# Patient Record
Sex: MALE | Race: WHITE | NOT HISPANIC OR LATINO
[De-identification: names, ages, dates, MRNs, and addresses within clinical notes are randomized per-mention and may not be internally consistent; named-entity substitution may affect disease eponyms.]

---

## 2018-05-28 ENCOUNTER — TRANSCRIPTION ENCOUNTER (OUTPATIENT)
Age: 58
End: 2018-05-28

## 2018-06-07 ENCOUNTER — APPOINTMENT (OUTPATIENT)
Dept: OPHTHALMOLOGY | Facility: CLINIC | Age: 58
End: 2018-06-07
Payer: COMMERCIAL

## 2018-06-07 PROCEDURE — 92004 COMPRE OPH EXAM NEW PT 1/>: CPT

## 2018-06-08 ENCOUNTER — APPOINTMENT (OUTPATIENT)
Dept: OPHTHALMOLOGY | Facility: CLINIC | Age: 58
End: 2018-06-08
Payer: COMMERCIAL

## 2018-06-08 PROCEDURE — 92014 COMPRE OPH EXAM EST PT 1/>: CPT

## 2018-06-12 ENCOUNTER — APPOINTMENT (OUTPATIENT)
Dept: OPHTHALMOLOGY | Facility: CLINIC | Age: 58
End: 2018-06-12
Payer: COMMERCIAL

## 2018-06-12 PROCEDURE — 92014 COMPRE OPH EXAM EST PT 1/>: CPT

## 2018-06-14 ENCOUNTER — APPOINTMENT (OUTPATIENT)
Dept: OPHTHALMOLOGY | Facility: CLINIC | Age: 58
End: 2018-06-14
Payer: COMMERCIAL

## 2018-06-14 PROCEDURE — 92012 INTRM OPH EXAM EST PATIENT: CPT

## 2018-06-21 ENCOUNTER — APPOINTMENT (OUTPATIENT)
Dept: OPHTHALMOLOGY | Facility: CLINIC | Age: 58
End: 2018-06-21
Payer: COMMERCIAL

## 2018-06-21 PROCEDURE — 92012 INTRM OPH EXAM EST PATIENT: CPT

## 2018-06-28 ENCOUNTER — APPOINTMENT (OUTPATIENT)
Dept: OPHTHALMOLOGY | Facility: CLINIC | Age: 58
End: 2018-06-28
Payer: COMMERCIAL

## 2018-06-28 PROCEDURE — 92014 COMPRE OPH EXAM EST PT 1/>: CPT

## 2018-07-27 ENCOUNTER — APPOINTMENT (OUTPATIENT)
Dept: OPHTHALMOLOGY | Facility: CLINIC | Age: 58
End: 2018-07-27
Payer: COMMERCIAL

## 2018-07-27 PROCEDURE — 92012 INTRM OPH EXAM EST PATIENT: CPT

## 2018-08-15 ENCOUNTER — TRANSCRIPTION ENCOUNTER (OUTPATIENT)
Age: 58
End: 2018-08-15

## 2018-09-25 ENCOUNTER — APPOINTMENT (OUTPATIENT)
Dept: OPHTHALMOLOGY | Facility: CLINIC | Age: 58
End: 2018-09-25

## 2018-10-25 ENCOUNTER — APPOINTMENT (OUTPATIENT)
Dept: OPHTHALMOLOGY | Facility: CLINIC | Age: 58
End: 2018-10-25

## 2018-11-30 ENCOUNTER — RECORD ABSTRACTING (OUTPATIENT)
Age: 58
End: 2018-11-30

## 2018-11-30 DIAGNOSIS — K21.9 GASTRO-ESOPHAGEAL REFLUX DISEASE W/OUT ESOPHAGITIS: ICD-10-CM

## 2018-12-05 ENCOUNTER — APPOINTMENT (OUTPATIENT)
Dept: PODIATRY | Facility: CLINIC | Age: 58
End: 2018-12-05
Payer: COMMERCIAL

## 2018-12-05 VITALS
SYSTOLIC BLOOD PRESSURE: 110 MMHG | HEIGHT: 71 IN | WEIGHT: 204 LBS | DIASTOLIC BLOOD PRESSURE: 70 MMHG | BODY MASS INDEX: 28.56 KG/M2

## 2018-12-05 DIAGNOSIS — Z80.0 FAMILY HISTORY OF MALIGNANT NEOPLASM OF DIGESTIVE ORGANS: ICD-10-CM

## 2018-12-05 DIAGNOSIS — M25.552 PAIN IN LEFT HIP: ICD-10-CM

## 2018-12-05 PROCEDURE — 99213 OFFICE O/P EST LOW 20 MIN: CPT

## 2018-12-05 RX ORDER — GANCICLOVIR 1.5 MG/G
0.15 GEL OPHTHALMIC
Qty: 1 | Refills: 3 | Status: DISCONTINUED | COMMUNITY
Start: 2018-06-07 | End: 2018-12-05

## 2018-12-05 RX ORDER — AMOXICILLIN AND CLAVULANATE POTASSIUM 875; 125 MG/1; 1/1
875-125 TABLET, FILM COATED ORAL
Refills: 0 | Status: DISCONTINUED | COMMUNITY
End: 2018-12-05

## 2018-12-05 RX ORDER — TRIFLURIDINE 10 MG/ML
1 SOLUTION OPHTHALMIC
Qty: 1 | Refills: 2 | Status: DISCONTINUED | COMMUNITY
Start: 2018-06-07 | End: 2018-12-05

## 2018-12-05 RX ORDER — BACITRACIN 500 [USP'U]/G
500 OINTMENT OPHTHALMIC
Qty: 1 | Refills: 3 | Status: DISCONTINUED | COMMUNITY
Start: 2018-06-07 | End: 2018-12-05

## 2018-12-06 ENCOUNTER — RESULT REVIEW (OUTPATIENT)
Age: 58
End: 2018-12-06

## 2018-12-10 ENCOUNTER — MOBILE ON CALL (OUTPATIENT)
Age: 58
End: 2018-12-10

## 2018-12-12 ENCOUNTER — APPOINTMENT (OUTPATIENT)
Dept: ORTHOPEDIC SURGERY | Facility: CLINIC | Age: 58
End: 2018-12-12

## 2019-02-08 ENCOUNTER — APPOINTMENT (OUTPATIENT)
Dept: GASTROENTEROLOGY | Facility: HOSPITAL | Age: 59
End: 2019-02-08

## 2019-02-08 ENCOUNTER — APPOINTMENT (OUTPATIENT)
Dept: GASTROENTEROLOGY | Facility: CLINIC | Age: 59
End: 2019-02-08
Payer: COMMERCIAL

## 2019-02-08 VITALS
SYSTOLIC BLOOD PRESSURE: 143 MMHG | WEIGHT: 204 LBS | DIASTOLIC BLOOD PRESSURE: 99 MMHG | HEIGHT: 71 IN | BODY MASS INDEX: 28.56 KG/M2 | HEART RATE: 76 BPM | OXYGEN SATURATION: 96 %

## 2019-02-08 PROCEDURE — 99214 OFFICE O/P EST MOD 30 MIN: CPT

## 2019-02-08 RX ORDER — DICLOFENAC 35 MG/1
35 CAPSULE ORAL
Refills: 0 | Status: DISCONTINUED | COMMUNITY
End: 2019-02-08

## 2019-02-08 NOTE — PHYSICAL EXAM
[Abdomen Soft] : soft [] : no hepato-splenomegaly [Abdomen Mass (___ Cm)] : no abdominal mass palpated [FreeTextEntry1] : mild llq tenderness, no guarding

## 2019-02-08 NOTE — HISTORY OF PRESENT ILLNESS
[FreeTextEntry1] : 1.  three week history of pain in the lower abdomen, diarrhea, off and on, irregular, sometimes marble like like, sometimes pranay\par can be firm in the pebble phase..\par no fever, no or occas chills.\par also some urinary dysfunction, interrupted urinary stream..\par some pain on left flank\par \par had diverticular resection;, 2005...had perforated s sigmoid diverticulm at that time.\par \par Meanwhile, the patient was doing well\par I had performed colonos and egd on octob 23d, 2017, and found a nl post op colo, some left sided tics, and egd with five cm HH.\par \par \par fast forward toabout three weeks. ago.\par \par nsaids until before four weeks ago..and this was for a torn ligament in foot\par about three weeks ago, started having some left sided pain, towards the left flank, going into llq, no fever, but irreg stools as mentioned above, loose, then constip, then pebble like..sometimes needs to strain..\par \par \par there is no change in reflux or upper gi sx.\par no fever that he has noticed.\par \par

## 2019-02-08 NOTE — ASSESSMENT
[FreeTextEntry1] : patient with new onset of lower abdom pain, and irreg bowel movements\par \par 2.  he has a history of diverticulitis with perforation and a resection\par \par 3.  also, some urinary symptoms, but more sx of urin freq, interrupted strea, not sx of a ureteral stone or renal coloic\par \par 4.  he took augmentin for several days, off and on, and it seemed to help, he had almost a ten day course, with an interruption of the meds which seemed to provoke some worstening\par \par 5.  now he does mot appear to have much tenderness, escept with deep llq palpation, and no guarding\par \par 6.  I would advise however that he take approx five more days of the augmentin, and also, take florastor a probiotic for the next month, 250 mg bid\par \par 7.  i am advising stat labs, and depending on how he is early next week, we could consider a ct scan, and perhaps even a urology consult

## 2019-02-09 LAB
APPEARANCE: CLEAR
BASOPHILS # BLD AUTO: 0.03 K/UL
BASOPHILS NFR BLD AUTO: 0.3 %
BILIRUBIN URINE: NEGATIVE
BLOOD URINE: NEGATIVE
COLOR: YELLOW
EOSINOPHIL # BLD AUTO: 0.33 K/UL
EOSINOPHIL NFR BLD AUTO: 2.9 %
ERYTHROCYTE [SEDIMENTATION RATE] IN BLOOD BY WESTERGREN METHOD: 14 MM/HR
GLUCOSE QUALITATIVE U: NEGATIVE MG/DL
HCT VFR BLD CALC: 46 %
HGB BLD-MCNC: 15.3 G/DL
IMM GRANULOCYTES NFR BLD AUTO: 0.3 %
KETONES URINE: NEGATIVE
LEUKOCYTE ESTERASE URINE: NEGATIVE
LYMPHOCYTES # BLD AUTO: 3.59 K/UL
LYMPHOCYTES NFR BLD AUTO: 31.7 %
MAN DIFF?: NORMAL
MCHC RBC-ENTMCNC: 29.3 PG
MCHC RBC-ENTMCNC: 33.3 GM/DL
MCV RBC AUTO: 88 FL
MONOCYTES # BLD AUTO: 0.8 K/UL
MONOCYTES NFR BLD AUTO: 7.1 %
NEUTROPHILS # BLD AUTO: 6.54 K/UL
NEUTROPHILS NFR BLD AUTO: 57.7 %
NITRITE URINE: NEGATIVE
PH URINE: 6
PLATELET # BLD AUTO: 334 K/UL
PROTEIN URINE: NEGATIVE MG/DL
RBC # BLD: 5.23 M/UL
RBC # FLD: 13.2 %
SPECIFIC GRAVITY URINE: 1.02
UROBILINOGEN URINE: NEGATIVE MG/DL
WBC # FLD AUTO: 11.32 K/UL

## 2019-02-11 ENCOUNTER — OTHER (OUTPATIENT)
Age: 59
End: 2019-02-11

## 2019-02-11 LAB
ALBUMIN SERPL ELPH-MCNC: 5 G/DL
ALP BLD-CCNC: 54 U/L
ALT SERPL-CCNC: 25 U/L
ANION GAP SERPL CALC-SCNC: 15 MMOL/L
AST SERPL-CCNC: 22 U/L
BILIRUB SERPL-MCNC: 1.2 MG/DL
BUN SERPL-MCNC: 15 MG/DL
CALCIUM SERPL-MCNC: 9.6 MG/DL
CHLORIDE SERPL-SCNC: 101 MMOL/L
CO2 SERPL-SCNC: 24 MMOL/L
CREAT SERPL-MCNC: 0.83 MG/DL
CRP SERPL-MCNC: 0.17 MG/DL
GLUCOSE SERPL-MCNC: 99 MG/DL
POTASSIUM SERPL-SCNC: 4.3 MMOL/L
PROT SERPL-MCNC: 7.3 G/DL
SODIUM SERPL-SCNC: 140 MMOL/L

## 2019-02-19 ENCOUNTER — RESULT REVIEW (OUTPATIENT)
Age: 59
End: 2019-02-19

## 2019-02-19 ENCOUNTER — CLINICAL ADVICE (OUTPATIENT)
Age: 59
End: 2019-02-19

## 2019-03-05 ENCOUNTER — APPOINTMENT (OUTPATIENT)
Dept: GASTROENTEROLOGY | Facility: CLINIC | Age: 59
End: 2019-03-05

## 2019-05-22 ENCOUNTER — RX RENEWAL (OUTPATIENT)
Age: 59
End: 2019-05-22

## 2019-06-18 ENCOUNTER — APPOINTMENT (OUTPATIENT)
Dept: OPHTHALMOLOGY | Facility: CLINIC | Age: 59
End: 2019-06-18
Payer: COMMERCIAL

## 2019-06-18 ENCOUNTER — NON-APPOINTMENT (OUTPATIENT)
Age: 59
End: 2019-06-18

## 2019-06-18 PROCEDURE — 92012 INTRM OPH EXAM EST PATIENT: CPT

## 2019-06-19 ENCOUNTER — RESULT REVIEW (OUTPATIENT)
Age: 59
End: 2019-06-19

## 2019-06-20 ENCOUNTER — APPOINTMENT (OUTPATIENT)
Dept: OPHTHALMOLOGY | Facility: CLINIC | Age: 59
End: 2019-06-20
Payer: COMMERCIAL

## 2019-06-20 ENCOUNTER — NON-APPOINTMENT (OUTPATIENT)
Age: 59
End: 2019-06-20

## 2019-06-20 PROCEDURE — 92012 INTRM OPH EXAM EST PATIENT: CPT

## 2019-06-25 ENCOUNTER — NON-APPOINTMENT (OUTPATIENT)
Age: 59
End: 2019-06-25

## 2019-06-25 ENCOUNTER — APPOINTMENT (OUTPATIENT)
Dept: OPHTHALMOLOGY | Facility: CLINIC | Age: 59
End: 2019-06-25
Payer: COMMERCIAL

## 2019-06-25 PROCEDURE — 92012 INTRM OPH EXAM EST PATIENT: CPT

## 2019-06-26 ENCOUNTER — APPOINTMENT (OUTPATIENT)
Dept: PODIATRY | Facility: CLINIC | Age: 59
End: 2019-06-26
Payer: COMMERCIAL

## 2019-06-26 VITALS
HEIGHT: 71 IN | DIASTOLIC BLOOD PRESSURE: 80 MMHG | SYSTOLIC BLOOD PRESSURE: 130 MMHG | WEIGHT: 204 LBS | BODY MASS INDEX: 28.56 KG/M2

## 2019-06-26 PROCEDURE — 99213 OFFICE O/P EST LOW 20 MIN: CPT

## 2019-06-26 RX ORDER — AMOXICILLIN AND CLAVULANATE POTASSIUM 875; 125 MG/1; MG/1
875-125 TABLET, COATED ORAL
Qty: 20 | Refills: 1 | Status: DISCONTINUED | COMMUNITY
Start: 2019-02-08 | End: 2019-06-26

## 2019-06-26 NOTE — HISTORY OF PRESENT ILLNESS
[FreeTextEntry1] :  the exact location of the plantar aspect of the right heel which now has MRI confirming tear of fascia

## 2019-06-26 NOTE — PROCEDURE
[FreeTextEntry1] : MRI reviewed, tx options d/w patient.\par had a lengthy discussion with the patient regarding the diagnosis etiology and differential diagnosis as well as treatment options for the presenting problem. Risks alternatives and benefits of treatment ranging from conservative to surgical explained in great detail. I also explained the progression of treatment from conservative to possible surgical treatment options as well as the benefits of each. I do stress conservative treatment if in fact conservative treatment is an option until it no longer provides relief. Over-the-counter products medications padding, and splinting were reviewed as well. All questions asked and answered appropriately to the patient's satisfaction\par I had a lengthy discussion with the patient regarding immobilization via a cam walker. After reviewing the benefits as well as the risks of being placed in a cam walker the patient has agreed. Next a new cam walker was removed from its packaging and customized to fit the patient's foot and leg and was instructed on how to use the Cam Walker. They were advised that they need to stay in the cam walker at times except showering and sleeping. I advised the patient that they should not drive with a cam walker. The Cam Walker was placed on the appropriate limb, instructions on how to ambulate with the cam walker were reviewed and the patient showed a knowledge on how to walk, remove, and reapply the cam walker. Complete discharge instructions were given as well as a follow up appointment.\par The patient was advised formal physical therapy is critical at this point and that I recommend it in order to try and achieve best possible outcome to their problem. Rx has been supplied.\par

## 2019-06-26 NOTE — PHYSICAL EXAM
[General Appearance - Alert] : alert [General Appearance - In No Acute Distress] : in no acute distress [Full Pulse] : the pedal pulses are present [Edema] : there was no peripheral edema [Nail Clubbing] : no clubbing  or cyanosis of the fingernails [Involuntary Movements] : no involuntary movements were seen [Motor Tone] : muscle strength and tone were normal [Skin Color & Pigmentation] : normal skin color and pigmentation [Skin Turgor] : normal skin turgor [] : no rash [FreeTextEntry1] : MRI confirms medial band of plantar fascia tear

## 2019-06-28 ENCOUNTER — APPOINTMENT (OUTPATIENT)
Dept: OPHTHALMOLOGY | Facility: CLINIC | Age: 59
End: 2019-06-28
Payer: COMMERCIAL

## 2019-06-28 ENCOUNTER — NON-APPOINTMENT (OUTPATIENT)
Age: 59
End: 2019-06-28

## 2019-06-28 PROCEDURE — 92012 INTRM OPH EXAM EST PATIENT: CPT

## 2019-07-12 ENCOUNTER — APPOINTMENT (OUTPATIENT)
Dept: OPHTHALMOLOGY | Facility: CLINIC | Age: 59
End: 2019-07-12
Payer: COMMERCIAL

## 2019-07-12 ENCOUNTER — NON-APPOINTMENT (OUTPATIENT)
Age: 59
End: 2019-07-12

## 2019-07-12 PROCEDURE — 92012 INTRM OPH EXAM EST PATIENT: CPT

## 2019-07-26 ENCOUNTER — APPOINTMENT (OUTPATIENT)
Dept: OPHTHALMOLOGY | Facility: CLINIC | Age: 59
End: 2019-07-26
Payer: COMMERCIAL

## 2019-07-26 ENCOUNTER — NON-APPOINTMENT (OUTPATIENT)
Age: 59
End: 2019-07-26

## 2019-07-26 PROCEDURE — 92012 INTRM OPH EXAM EST PATIENT: CPT

## 2019-09-03 ENCOUNTER — APPOINTMENT (OUTPATIENT)
Dept: OPHTHALMOLOGY | Facility: CLINIC | Age: 59
End: 2019-09-03

## 2019-09-18 ENCOUNTER — APPOINTMENT (OUTPATIENT)
Dept: INTERNAL MEDICINE | Facility: CLINIC | Age: 59
End: 2019-09-18
Payer: COMMERCIAL

## 2019-09-18 ENCOUNTER — APPOINTMENT (OUTPATIENT)
Dept: PODIATRY | Facility: CLINIC | Age: 59
End: 2019-09-18

## 2019-09-18 VITALS
HEIGHT: 71 IN | WEIGHT: 204 LBS | BODY MASS INDEX: 28.56 KG/M2 | DIASTOLIC BLOOD PRESSURE: 90 MMHG | SYSTOLIC BLOOD PRESSURE: 120 MMHG

## 2019-09-18 PROCEDURE — 99203 OFFICE O/P NEW LOW 30 MIN: CPT

## 2019-09-18 NOTE — HISTORY OF PRESENT ILLNESS
[FreeTextEntry1] : Patient with a skin lesion on the scalp. Patient concerned of possible Lyme disease. [de-identified] : Patient noted a lesion on the scalp about 3 weeks ago. He is concerned that it might represent Lyme disease. He feels fine without muscle or joint pain. No fever or chills. Past medical history significant for hypertension, hyperlipidemia and GERD. Medications include lisinopril Crestor and proton X. He is a former smoker for more than 30 years but quit 12 years ago. He has gone for lung cancer screening studies yearly.

## 2019-09-18 NOTE — PHYSICAL EXAM
[No Acute Distress] : no acute distress [Well Nourished] : well nourished [Well Developed] : well developed [Well-Appearing] : well-appearing [Normal Sclera/Conjunctiva] : normal sclera/conjunctiva [PERRL] : pupils equal round and reactive to light [EOMI] : extraocular movements intact [Normal Outer Ear/Nose] : the outer ears and nose were normal in appearance [Normal Oropharynx] : the oropharynx was normal [No JVD] : no jugular venous distention [Supple] : supple [No Lymphadenopathy] : no lymphadenopathy [Thyroid Normal, No Nodules] : the thyroid was normal and there were no nodules present [No Respiratory Distress] : no respiratory distress  [No Accessory Muscle Use] : no accessory muscle use [Normal Rate] : normal rate  [Clear to Auscultation] : lungs were clear to auscultation bilaterally [Regular Rhythm] : with a regular rhythm [Normal S1, S2] : normal S1 and S2 [No Murmur] : no murmur heard [No Carotid Bruits] : no carotid bruits [No Abdominal Bruit] : a ~M bruit was not heard ~T in the abdomen [No Varicosities] : no varicosities [Pedal Pulses Present] : the pedal pulses are present [No Edema] : there was no peripheral edema [No Extremity Clubbing/Cyanosis] : no extremity clubbing/cyanosis [No Palpable Aorta] : no palpable aorta [Soft] : abdomen soft [Non-distended] : non-distended [Non Tender] : non-tender [No Masses] : no abdominal mass palpated [No HSM] : no HSM [Normal Bowel Sounds] : normal bowel sounds [Normal Posterior Cervical Nodes] : no posterior cervical lymphadenopathy [Normal Anterior Cervical Nodes] : no anterior cervical lymphadenopathy [No CVA Tenderness] : no CVA  tenderness [No Spinal Tenderness] : no spinal tenderness [No Joint Swelling] : no joint swelling [Grossly Normal Strength/Tone] : grossly normal strength/tone [No Rash] : no rash [Coordination Grossly Intact] : coordination grossly intact [Normal Gait] : normal gait [No Focal Deficits] : no focal deficits [Normal Affect] : the affect was normal [Normal Insight/Judgement] : insight and judgment were intact [de-identified] : erythematous papule on scalp

## 2019-09-18 NOTE — ASSESSMENT
[FreeTextEntry1] : Patient with an erythematous papule on the scalp. This could represent a basal cell cancer. I do not believe this represents lung disease. Patient will be referred to dermatology.

## 2019-11-06 ENCOUNTER — NON-APPOINTMENT (OUTPATIENT)
Age: 59
End: 2019-11-06

## 2019-11-06 ENCOUNTER — APPOINTMENT (OUTPATIENT)
Dept: INTERNAL MEDICINE | Facility: CLINIC | Age: 59
End: 2019-11-06
Payer: COMMERCIAL

## 2019-11-06 VITALS
DIASTOLIC BLOOD PRESSURE: 80 MMHG | BODY MASS INDEX: 28.56 KG/M2 | WEIGHT: 204 LBS | SYSTOLIC BLOOD PRESSURE: 120 MMHG | HEIGHT: 71 IN

## 2019-11-06 PROCEDURE — 99396 PREV VISIT EST AGE 40-64: CPT | Mod: 25

## 2019-11-06 PROCEDURE — 36415 COLL VENOUS BLD VENIPUNCTURE: CPT

## 2019-11-06 PROCEDURE — 93000 ELECTROCARDIOGRAM COMPLETE: CPT

## 2019-11-06 NOTE — PHYSICAL EXAM
[No Acute Distress] : no acute distress [Well Nourished] : well nourished [Well Developed] : well developed [Well-Appearing] : well-appearing [Normal Sclera/Conjunctiva] : normal sclera/conjunctiva [PERRL] : pupils equal round and reactive to light [EOMI] : extraocular movements intact [Normal Outer Ear/Nose] : the outer ears and nose were normal in appearance [Normal Oropharynx] : the oropharynx was normal [No JVD] : no jugular venous distention [No Lymphadenopathy] : no lymphadenopathy [Supple] : supple [Thyroid Normal, No Nodules] : the thyroid was normal and there were no nodules present [No Respiratory Distress] : no respiratory distress  [No Accessory Muscle Use] : no accessory muscle use [Clear to Auscultation] : lungs were clear to auscultation bilaterally [Normal Rate] : normal rate  [Regular Rhythm] : with a regular rhythm [Normal S1, S2] : normal S1 and S2 [No Murmur] : no murmur heard [No Carotid Bruits] : no carotid bruits [No Abdominal Bruit] : a ~M bruit was not heard ~T in the abdomen [No Varicosities] : no varicosities [Pedal Pulses Present] : the pedal pulses are present [No Edema] : there was no peripheral edema [No Palpable Aorta] : no palpable aorta [No Extremity Clubbing/Cyanosis] : no extremity clubbing/cyanosis [Soft] : abdomen soft [Non Tender] : non-tender [Non-distended] : non-distended [No Masses] : no abdominal mass palpated [No HSM] : no HSM [Normal Bowel Sounds] : normal bowel sounds [Normal Posterior Cervical Nodes] : no posterior cervical lymphadenopathy [Normal Anterior Cervical Nodes] : no anterior cervical lymphadenopathy [No CVA Tenderness] : no CVA  tenderness [No Spinal Tenderness] : no spinal tenderness [No Joint Swelling] : no joint swelling [Grossly Normal Strength/Tone] : grossly normal strength/tone [No Rash] : no rash [Coordination Grossly Intact] : coordination grossly intact [No Focal Deficits] : no focal deficits [Normal Gait] : normal gait [Normal Affect] : the affect was normal [Normal Insight/Judgement] : insight and judgment were intact [FreeTextEntry1] : 1+ prostate

## 2019-11-06 NOTE — HISTORY OF PRESENT ILLNESS
[FreeTextEntry1] : Routine physical exam her [de-identified] : This is a 58-year-old male with a history of hypertension, hyperlipidemia, GERD, diverticulitis and a former smoker. He generally feels well without chronic complaints. His medications were reviewed. These include protime next, Crestor, lisinopril and see attack. He feels generally tired but the patient works 2 jobs. His last colonoscopy was in 2017. He is a former smoker one pack per day for 32 years and quit at age 47. He has yearly CT screening of the lung last time being in 2018. Patient has received a flu vaccine.

## 2019-11-06 NOTE — HISTORY OF PRESENT ILLNESS
[FreeTextEntry1] : Routine physical exam her [de-identified] : This is a 58-year-old male with a history of hypertension, hyperlipidemia, GERD, diverticulitis and a former smoker. He generally feels well without chronic complaints. His medications were reviewed. These include protime next, Crestor, lisinopril and see attack. He feels generally tired but the patient works 2 jobs. His last colonoscopy was in 2017. He is a former smoker one pack per day for 32 years and quit at age 47. He has yearly CT screening of the lung last time being in 2018. Patient has received a flu vaccine.

## 2019-11-06 NOTE — ASSESSMENT
[FreeTextEntry1] : Patient's blood pressure currently 135/80. Patient appears in good health. EKG is within normal limits. We'll check one his last screening CAT scan of the lung was. 2 check routine labs. Patient is to call for lab results.

## 2019-11-07 LAB
25(OH)D3 SERPL-MCNC: 23.1 NG/ML
ALBUMIN SERPL ELPH-MCNC: 4.7 G/DL
ALP BLD-CCNC: 51 U/L
ALT SERPL-CCNC: 25 U/L
ANION GAP SERPL CALC-SCNC: 16 MMOL/L
AST SERPL-CCNC: 25 U/L
BASOPHILS # BLD AUTO: 0.06 K/UL
BASOPHILS NFR BLD AUTO: 0.6 %
BILIRUB SERPL-MCNC: 0.8 MG/DL
BUN SERPL-MCNC: 16 MG/DL
CALCIUM SERPL-MCNC: 9.6 MG/DL
CHLORIDE SERPL-SCNC: 101 MMOL/L
CHOLEST SERPL-MCNC: 183 MG/DL
CHOLEST/HDLC SERPL: 4.1 RATIO
CO2 SERPL-SCNC: 23 MMOL/L
CREAT SERPL-MCNC: 0.78 MG/DL
EOSINOPHIL # BLD AUTO: 0.47 K/UL
EOSINOPHIL NFR BLD AUTO: 4.7 %
ESTIMATED AVERAGE GLUCOSE: 120 MG/DL
GLUCOSE SERPL-MCNC: 115 MG/DL
HBA1C MFR BLD HPLC: 5.8 %
HCT VFR BLD CALC: 46.3 %
HDLC SERPL-MCNC: 45 MG/DL
HGB BLD-MCNC: 14.5 G/DL
IMM GRANULOCYTES NFR BLD AUTO: 0.2 %
LDLC SERPL CALC-MCNC: 98 MG/DL
LYMPHOCYTES # BLD AUTO: 3.34 K/UL
LYMPHOCYTES NFR BLD AUTO: 33.2 %
MAN DIFF?: NORMAL
MCHC RBC-ENTMCNC: 29.3 PG
MCHC RBC-ENTMCNC: 31.3 GM/DL
MCV RBC AUTO: 93.5 FL
MONOCYTES # BLD AUTO: 0.75 K/UL
MONOCYTES NFR BLD AUTO: 7.5 %
NEUTROPHILS # BLD AUTO: 5.41 K/UL
NEUTROPHILS NFR BLD AUTO: 53.8 %
PLATELET # BLD AUTO: 339 K/UL
POTASSIUM SERPL-SCNC: 4.7 MMOL/L
PROT SERPL-MCNC: 7.2 G/DL
PSA SERPL-MCNC: 0.89 NG/ML
RBC # BLD: 4.95 M/UL
RBC # FLD: 13.1 %
SODIUM SERPL-SCNC: 140 MMOL/L
TRIGL SERPL-MCNC: 201 MG/DL
TSH SERPL-ACNC: 0.99 UIU/ML
WBC # FLD AUTO: 10.05 K/UL

## 2019-11-12 ENCOUNTER — APPOINTMENT (OUTPATIENT)
Dept: INTERNAL MEDICINE | Facility: CLINIC | Age: 59
End: 2019-11-12
Payer: COMMERCIAL

## 2019-11-12 VITALS
SYSTOLIC BLOOD PRESSURE: 120 MMHG | HEIGHT: 71 IN | HEART RATE: 71 BPM | OXYGEN SATURATION: 99 % | DIASTOLIC BLOOD PRESSURE: 80 MMHG | BODY MASS INDEX: 28.56 KG/M2 | WEIGHT: 204 LBS | TEMPERATURE: 98.9 F

## 2019-11-12 DIAGNOSIS — J06.9 ACUTE UPPER RESPIRATORY INFECTION, UNSPECIFIED: ICD-10-CM

## 2019-11-12 PROCEDURE — 99213 OFFICE O/P EST LOW 20 MIN: CPT

## 2019-11-12 NOTE — ASSESSMENT
[FreeTextEntry1] : Patient will likely viral upper respiratory tract infection. I suspect symptoms will resolve in about 3-5 days. Patient should be off work for the next 2 days. I will treat with Hycodan elixir at bedtime p.r.n. cough.

## 2019-11-12 NOTE — PHYSICAL EXAM
[No Acute Distress] : no acute distress [Well Nourished] : well nourished [Well-Appearing] : well-appearing [Well Developed] : well developed [Normal Sclera/Conjunctiva] : normal sclera/conjunctiva [PERRL] : pupils equal round and reactive to light [EOMI] : extraocular movements intact [Normal Outer Ear/Nose] : the outer ears and nose were normal in appearance [No JVD] : no jugular venous distention [Normal Oropharynx] : the oropharynx was normal [No Lymphadenopathy] : no lymphadenopathy [Supple] : supple [No Respiratory Distress] : no respiratory distress  [Thyroid Normal, No Nodules] : the thyroid was normal and there were no nodules present [No Accessory Muscle Use] : no accessory muscle use [Clear to Auscultation] : lungs were clear to auscultation bilaterally [Normal Rate] : normal rate  [Normal S1, S2] : normal S1 and S2 [Regular Rhythm] : with a regular rhythm [No Murmur] : no murmur heard [No Carotid Bruits] : no carotid bruits [No Varicosities] : no varicosities [No Abdominal Bruit] : a ~M bruit was not heard ~T in the abdomen [No Edema] : there was no peripheral edema [Pedal Pulses Present] : the pedal pulses are present [No Extremity Clubbing/Cyanosis] : no extremity clubbing/cyanosis [No Palpable Aorta] : no palpable aorta [Soft] : abdomen soft [Non-distended] : non-distended [No Masses] : no abdominal mass palpated [Non Tender] : non-tender [Normal Bowel Sounds] : normal bowel sounds [No HSM] : no HSM [Normal Posterior Cervical Nodes] : no posterior cervical lymphadenopathy [Normal Anterior Cervical Nodes] : no anterior cervical lymphadenopathy [No CVA Tenderness] : no CVA  tenderness [No Joint Swelling] : no joint swelling [No Spinal Tenderness] : no spinal tenderness [Grossly Normal Strength/Tone] : grossly normal strength/tone [No Rash] : no rash [Coordination Grossly Intact] : coordination grossly intact [No Focal Deficits] : no focal deficits [Normal Gait] : normal gait [Normal Insight/Judgement] : insight and judgment were intact [Normal Affect] : the affect was normal

## 2019-11-12 NOTE — HISTORY OF PRESENT ILLNESS
[FreeTextEntry1] : Cough, rhinorrhea x4 days. [de-identified] : Patient with a 4-5 day history of rhinorrhea sore throat and dry cough. He feels weak. He feels chilled but no obvious fever. The cough is keeping him up at night. He denies wheezing. He feels fatigued.

## 2019-12-19 ENCOUNTER — RX RENEWAL (OUTPATIENT)
Age: 59
End: 2019-12-19

## 2019-12-20 ENCOUNTER — MEDICATION RENEWAL (OUTPATIENT)
Age: 59
End: 2019-12-20

## 2020-04-26 ENCOUNTER — MESSAGE (OUTPATIENT)
Age: 60
End: 2020-04-26

## 2020-05-03 LAB
SARS-COV-2 IGG SERPL IA-ACNC: 0 INDEX
SARS-COV-2 IGG SERPL QL IA: NEGATIVE

## 2020-07-02 ENCOUNTER — TRANSCRIPTION ENCOUNTER (OUTPATIENT)
Age: 60
End: 2020-07-02

## 2020-10-13 ENCOUNTER — RX RENEWAL (OUTPATIENT)
Age: 60
End: 2020-10-13

## 2020-10-20 ENCOUNTER — APPOINTMENT (OUTPATIENT)
Dept: GASTROENTEROLOGY | Facility: CLINIC | Age: 60
End: 2020-10-20
Payer: COMMERCIAL

## 2020-10-20 VITALS
WEIGHT: 210 LBS | DIASTOLIC BLOOD PRESSURE: 90 MMHG | BODY MASS INDEX: 29.4 KG/M2 | TEMPERATURE: 96.7 F | HEART RATE: 72 BPM | HEIGHT: 71 IN | SYSTOLIC BLOOD PRESSURE: 136 MMHG

## 2020-10-20 PROCEDURE — 99214 OFFICE O/P EST MOD 30 MIN: CPT | Mod: 25

## 2020-10-20 PROCEDURE — 99072 ADDL SUPL MATRL&STAF TM PHE: CPT

## 2020-10-20 RX ORDER — DICYCLOMINE HYDROCHLORIDE 20 MG/1
20 TABLET ORAL TWICE DAILY
Qty: 60 | Refills: 5 | Status: ACTIVE | COMMUNITY
Start: 2020-10-20 | End: 1900-01-01

## 2020-10-20 NOTE — PHYSICAL EXAM
[Abdomen Soft] : soft [Abdomen Mass (___ Cm)] : no abdominal mass palpated [Abdomen Tenderness] : non-tender [] : no hepato-splenomegaly [Abdomen Hernia] : no hernia was discovered [Normal Sphincter Tone] : normal sphincter tone [No Rectal Mass] : no rectal mass [Internal Hemorrhoid] : no internal hemorrhoids [Occult Blood Positive] : stool was negative for occult blood [FreeTextEntry1] : no fissure

## 2020-10-20 NOTE — HISTORY OF PRESENT ILLNESS
[FreeTextEntry1] : patient who has previously had sigmoid resection for diverticulitis,\par now with soreness across lower abdomen, for six weeks \par \par intermitt diarrhea and this alternates with hard stool\par \par low fiber diet \par bagle breakfast\par no lunch\par sandwich and banana for dinner\par \par no or virtually no fiber

## 2020-10-20 NOTE — ASSESSMENT
[FreeTextEntry1] : ibs\par low abdom tenderness\par hx of sigmoid resection\par \par DR FIELD'S COMMON SENSE RECOMMENDATIONS FOR IMPROVING CONSTIPATION,  WITH OR WITHOUT PRESCRIPTION MEDICATION\par \par 1.  slowly increase dietary fiber\par 2.  breakfast is especially important for good bowel regime\par 3.  high fiber breakfast cereal such as Kashi or Fiber 1 is a useful way to increase dietary fiber\par 4.  hot beverage or hot cereal may also be helpful at breakfast\par 5.  fluid intake to avoid dehydration;  eight glasses of non caffeinated, non alcoholic fluids per day [64 fll oz]\par 6.  prunes can be helpful; 3-6 per day [alternative is prune juice]\par 7.  add unprocessed bran to foods, beginning with one teaspoon per day\par 8   add flaxseed to foods, starting with one tablespoon and increasing slowly\par \par work on diet plus bent plus anusol hc suppos\par More than 50% of the face to face time was devoted to counseling and /or coordination of care.  THis coordination of care may have included reviewing other medical notes and reports, and communicating with other health professionals\par

## 2020-11-05 ENCOUNTER — APPOINTMENT (OUTPATIENT)
Dept: INTERNAL MEDICINE | Facility: CLINIC | Age: 60
End: 2020-11-05
Payer: COMMERCIAL

## 2020-11-05 PROCEDURE — 36415 COLL VENOUS BLD VENIPUNCTURE: CPT

## 2020-11-05 PROCEDURE — 99072 ADDL SUPL MATRL&STAF TM PHE: CPT

## 2020-11-16 ENCOUNTER — APPOINTMENT (OUTPATIENT)
Dept: GASTROENTEROLOGY | Facility: CLINIC | Age: 60
End: 2020-11-16

## 2020-11-17 ENCOUNTER — APPOINTMENT (OUTPATIENT)
Dept: INTERNAL MEDICINE | Facility: CLINIC | Age: 60
End: 2020-11-17
Payer: COMMERCIAL

## 2020-11-17 VITALS
WEIGHT: 205 LBS | HEART RATE: 70 BPM | OXYGEN SATURATION: 97 % | DIASTOLIC BLOOD PRESSURE: 90 MMHG | SYSTOLIC BLOOD PRESSURE: 140 MMHG | BODY MASS INDEX: 28.7 KG/M2 | HEIGHT: 71 IN

## 2020-11-17 PROCEDURE — 99072 ADDL SUPL MATRL&STAF TM PHE: CPT

## 2020-11-17 PROCEDURE — 99396 PREV VISIT EST AGE 40-64: CPT

## 2020-11-17 NOTE — ASSESSMENT
[FreeTextEntry1] : Current blood pressure 125/85. Lab exams were reviewed. His hemoglobin A1c is a bit high at 6.0. Patient is advised to lose 5-10 pounds and reduce his carbohydrates. He is advised to obtain a low dose CAT scan of the chest for lung cancer screening in view of his smoking history. Patient will return to see me in 6 months.

## 2020-11-17 NOTE — HISTORY OF PRESENT ILLNESS
[FreeTextEntry1] : Routine physical exam appear [de-identified] : 60-year-old male with a history of hypertension, hyperlipidemia, GERD, prior diverticulitis and a former one pack per day smoker for 32 years. He quit smoking in 2008. He feels generally well without chronic complaints. He works 2 jobs and he eats poorly. He does not exercise. His labs were reviewed cholesterol 171 LDL 96. Hemoglobin A1c 6.0. He recently saw Dr. brown for about of irritable bowel syndrome. Medications were reviewed. His last CAT scan of the chest he believes was in 2018. This was a screening study. His current weight is 204.

## 2020-11-18 LAB
25(OH)D3 SERPL-MCNC: 23 NG/ML
ALBUMIN SERPL ELPH-MCNC: 4.6 G/DL
ALP BLD-CCNC: 55 U/L
ALT SERPL-CCNC: 19 U/L
ANION GAP SERPL CALC-SCNC: 17 MMOL/L
AST SERPL-CCNC: 23 U/L
BASOPHILS # BLD AUTO: 0.07 K/UL
BASOPHILS NFR BLD AUTO: 0.7 %
BILIRUB SERPL-MCNC: 1 MG/DL
BUN SERPL-MCNC: 15 MG/DL
CALCIUM SERPL-MCNC: 9.1 MG/DL
CHLORIDE SERPL-SCNC: 105 MMOL/L
CHOLEST SERPL-MCNC: 170 MG/DL
CO2 SERPL-SCNC: 21 MMOL/L
CREAT SERPL-MCNC: 0.74 MG/DL
EOSINOPHIL # BLD AUTO: 0.5 K/UL
EOSINOPHIL NFR BLD AUTO: 4.8 %
ESTIMATED AVERAGE GLUCOSE: 126 MG/DL
GLUCOSE SERPL-MCNC: 96 MG/DL
HBA1C MFR BLD HPLC: 6 %
HCT VFR BLD CALC: 46.1 %
HDLC SERPL-MCNC: 47 MG/DL
HGB BLD-MCNC: 14.5 G/DL
IMM GRANULOCYTES NFR BLD AUTO: 0.2 %
LDLC SERPL CALC-MCNC: 96 MG/DL
LYMPHOCYTES # BLD AUTO: 3.42 K/UL
LYMPHOCYTES NFR BLD AUTO: 32.9 %
MAN DIFF?: NORMAL
MCHC RBC-ENTMCNC: 29.2 PG
MCHC RBC-ENTMCNC: 31.5 GM/DL
MCV RBC AUTO: 92.9 FL
MONOCYTES # BLD AUTO: 0.74 K/UL
MONOCYTES NFR BLD AUTO: 7.1 %
NEUTROPHILS # BLD AUTO: 5.65 K/UL
NEUTROPHILS NFR BLD AUTO: 54.3 %
NONHDLC SERPL-MCNC: 123 MG/DL
PLATELET # BLD AUTO: 302 K/UL
POTASSIUM SERPL-SCNC: 4.4 MMOL/L
PROT SERPL-MCNC: 7 G/DL
PSA SERPL-MCNC: 0.77 NG/ML
RBC # BLD: 4.96 M/UL
RBC # FLD: 13.2 %
SODIUM SERPL-SCNC: 142 MMOL/L
TRIGL SERPL-MCNC: 137 MG/DL
TSH SERPL-ACNC: 0.55 UIU/ML
WBC # FLD AUTO: 10.4 K/UL

## 2020-11-23 ENCOUNTER — NON-APPOINTMENT (OUTPATIENT)
Age: 60
End: 2020-11-23

## 2020-11-23 VITALS — BODY MASS INDEX: 28.56 KG/M2 | HEIGHT: 71 IN | WEIGHT: 204 LBS

## 2020-11-23 DIAGNOSIS — Z87.09 PERSONAL HISTORY OF OTHER DISEASES OF THE RESPIRATORY SYSTEM: ICD-10-CM

## 2020-11-23 NOTE — REASON FOR VISIT
[Other Location: e.g. School (Enter Location, City,State)___] : at [unfilled], at the time of the visit. [Medical Office: ([facility])___] : at the medical office located in  [Verbal consent obtained from patient] : the patient, [unfilled] [Annual Follow-Up] : an annual follow-up visit [Review of Eligibility] : review of eligibility [Low-Dose CT Screening Discussion] : low-dose CT lung cancer screening discussion

## 2020-11-23 NOTE — HISTORY OF PRESENT ILLNESS
[Former] : former smoker [_____ pack-years] : [unfilled] pack-years [TextBox_13] : Referred by \par \par BE JAIMES had telephonic visit for a review of eligibility and discussion of the Low dose CT lung cancer screening program.The following was reviewed and confirmed the patient meets screening eligibility criteria.\par -Age 60 year\par Smoking Status:\par -Former smoker\par -Number of pack(s) per day: 1PPD\par -Number of years smoked: 32\par -Number of pack years smokin\par -Number of years since quitting smokin years\par -Quit year: \par \par Mr. JAIMES   denies any signs or symptoms of lung cancer including new cough, changing cough, hemoptysis, and unintentional weight loss. \par \par Mr. JAIMES  denies any personal history of lung cancer. Reports no lung cancer in a 1st degree relative. Reports history of pulmonary nodules and COPD/emphysema. Denies any history of  occupational exposures.\par  [TextBox_10] : 2008

## 2020-11-23 NOTE — PLAN
[Lifestlye changes] : lifestyle changes [FreeTextEntry1] : Plan:\par \par -Low dose CT chest for lung cancer screening. KAILEY ALBRECHT ordered the low dose CT.     .\par \par -Follow up with patient and his referring provider after his LDCT results have been reviewed by the multidisciplinary clinical team.\par \par Engaged in discussion regarding risks of screening during Covid-19 pandemic and precautions that are being used  to reduce exposure.\par \par Engaged in shared decision making with Fabiana FIONA . Answered all questions. He verbalized understanding and agreement. He knows to call back with and questions or concerns.\par

## 2020-11-23 NOTE — DATA REVIEWED
[2] : 2 [TextBox_12] : 08/17 [TextBox_27] : 11/18 [TextBox_57] : Followed regularly for pulmonary nodules

## 2020-12-03 ENCOUNTER — RESULT REVIEW (OUTPATIENT)
Age: 60
End: 2020-12-03

## 2020-12-04 ENCOUNTER — NON-APPOINTMENT (OUTPATIENT)
Age: 60
End: 2020-12-04

## 2020-12-21 PROBLEM — J06.9 UPPER RESPIRATORY INFECTION, ACUTE: Status: RESOLVED | Noted: 2019-11-12 | Resolved: 2020-12-21

## 2021-04-01 ENCOUNTER — NON-APPOINTMENT (OUTPATIENT)
Age: 61
End: 2021-04-01

## 2021-04-01 ENCOUNTER — APPOINTMENT (OUTPATIENT)
Dept: OPHTHALMOLOGY | Facility: CLINIC | Age: 61
End: 2021-04-01
Payer: COMMERCIAL

## 2021-04-01 PROCEDURE — 99072 ADDL SUPL MATRL&STAF TM PHE: CPT

## 2021-04-01 PROCEDURE — 92014 COMPRE OPH EXAM EST PT 1/>: CPT

## 2021-05-13 ENCOUNTER — APPOINTMENT (OUTPATIENT)
Dept: INTERNAL MEDICINE | Facility: CLINIC | Age: 61
End: 2021-05-13

## 2021-05-19 ENCOUNTER — APPOINTMENT (OUTPATIENT)
Dept: INTERNAL MEDICINE | Facility: CLINIC | Age: 61
End: 2021-05-19
Payer: COMMERCIAL

## 2021-05-19 PROCEDURE — 36415 COLL VENOUS BLD VENIPUNCTURE: CPT

## 2021-05-19 PROCEDURE — 99072 ADDL SUPL MATRL&STAF TM PHE: CPT

## 2021-06-11 LAB
ALBUMIN SERPL ELPH-MCNC: 4.4 G/DL
ALP BLD-CCNC: 50 U/L
ALT SERPL-CCNC: 22 U/L
ANION GAP SERPL CALC-SCNC: 11 MMOL/L
AST SERPL-CCNC: 20 U/L
BASOPHILS # BLD AUTO: 0.05 K/UL
BASOPHILS NFR BLD AUTO: 0.5 %
BILIRUB SERPL-MCNC: 0.8 MG/DL
BUN SERPL-MCNC: 15 MG/DL
CALCIUM SERPL-MCNC: 9.4 MG/DL
CHLORIDE SERPL-SCNC: 108 MMOL/L
CHOLEST SERPL-MCNC: 161 MG/DL
CO2 SERPL-SCNC: 24 MMOL/L
CREAT SERPL-MCNC: 0.74 MG/DL
EOSINOPHIL # BLD AUTO: 0.38 K/UL
EOSINOPHIL NFR BLD AUTO: 4 %
ESTIMATED AVERAGE GLUCOSE: 131 MG/DL
GLUCOSE SERPL-MCNC: 98 MG/DL
HBA1C MFR BLD HPLC: 6.2 %
HCT VFR BLD CALC: 46.1 %
HDLC SERPL-MCNC: 44 MG/DL
HGB BLD-MCNC: 14.8 G/DL
IMM GRANULOCYTES NFR BLD AUTO: 0.2 %
LDLC SERPL CALC-MCNC: 84 MG/DL
LYMPHOCYTES # BLD AUTO: 3.27 K/UL
LYMPHOCYTES NFR BLD AUTO: 34.6 %
MAN DIFF?: NORMAL
MCHC RBC-ENTMCNC: 29.2 PG
MCHC RBC-ENTMCNC: 32.1 GM/DL
MCV RBC AUTO: 91.1 FL
MONOCYTES # BLD AUTO: 0.57 K/UL
MONOCYTES NFR BLD AUTO: 6 %
NEUTROPHILS # BLD AUTO: 5.15 K/UL
NEUTROPHILS NFR BLD AUTO: 54.7 %
NONHDLC SERPL-MCNC: 117 MG/DL
PLATELET # BLD AUTO: 329 K/UL
POTASSIUM SERPL-SCNC: 4.7 MMOL/L
PROT SERPL-MCNC: 7 G/DL
RBC # BLD: 5.06 M/UL
RBC # FLD: 13 %
SODIUM SERPL-SCNC: 143 MMOL/L
TRIGL SERPL-MCNC: 165 MG/DL
WBC # FLD AUTO: 9.44 K/UL

## 2021-08-30 ENCOUNTER — APPOINTMENT (OUTPATIENT)
Dept: INTERNAL MEDICINE | Facility: CLINIC | Age: 61
End: 2021-08-30
Payer: COMMERCIAL

## 2021-08-30 VITALS
HEIGHT: 71 IN | DIASTOLIC BLOOD PRESSURE: 80 MMHG | HEART RATE: 76 BPM | SYSTOLIC BLOOD PRESSURE: 130 MMHG | OXYGEN SATURATION: 98 % | BODY MASS INDEX: 28 KG/M2 | TEMPERATURE: 97 F | WEIGHT: 200 LBS

## 2021-08-30 PROCEDURE — 99213 OFFICE O/P EST LOW 20 MIN: CPT

## 2021-08-30 NOTE — HISTORY OF PRESENT ILLNESS
[FreeTextEntry1] : Neck pain. [de-identified] : Patient complains of one-month history of left sided neck pain when he turns his head towards the left. It does not run down the arm. There is no weakness or paresthesias. He only gets pain when he turns his neck to the left. He is concerned because of his past smoking history and is concerned about lung cancer. However the patient had a screening lung cancer CAT scan in December of last year which was unremarkable.

## 2021-08-30 NOTE — ASSESSMENT
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 I would recommend Aleve 2 tabs b.i.d. for 7 days. Patient is advised to call me in 10 days. I do not think we are dealing with pulmonary pathology and I have reassured the patient. If the pain persists after 10 days I can do an x-ray of the cervical spine

## 2021-09-10 ENCOUNTER — APPOINTMENT (OUTPATIENT)
Dept: OPHTHALMOLOGY | Facility: CLINIC | Age: 61
End: 2021-09-10
Payer: COMMERCIAL

## 2021-09-10 ENCOUNTER — NON-APPOINTMENT (OUTPATIENT)
Age: 61
End: 2021-09-10

## 2021-09-10 PROCEDURE — 92014 COMPRE OPH EXAM EST PT 1/>: CPT

## 2021-10-20 ENCOUNTER — APPOINTMENT (OUTPATIENT)
Dept: GASTROENTEROLOGY | Facility: CLINIC | Age: 61
End: 2021-10-20
Payer: COMMERCIAL

## 2021-10-20 DIAGNOSIS — K58.9 IRRITABLE BOWEL SYNDROME W/OUT DIARRHEA: ICD-10-CM

## 2021-10-20 PROCEDURE — 99442: CPT

## 2021-10-20 NOTE — ASSESSMENT
[FreeTextEntry1] : 1. i am suspicious that patient is just having IBS symptoms\par \par 2.  and that this is not the real pattern of recurrent inflammatory dx such as diverticulitis\par \par 3.  i have advised the patient to increase his dietary fiber and especially when his symptoms are acting up, with the cramps being the signal that he should\par a.  begin his bentyl or dicyclomine\par b.  ramp up his dietary fiber\par \par 4.  family hx of colon ca, his mother\par so he should set up colonoscopy\par he himself has had resection for diverticulitis, and also, hx of colonic polyps\par \par 5.  with long standing reflux, add EGD as well\par \par More than 50% of the face to face time was devoted to counseling and /or coordination of care.  THis coordination of care may have included reviewing other medical notes and reports, and communicating with other health professionals\par

## 2021-10-20 NOTE — HISTORY OF PRESENT ILLNESS
[FreeTextEntry1] : telephonic visit\par audio  only\par consent on file\par patient at home\par i am in my [place] office\par \par patient has a very specific pattern \par \par generally every couple weeks\par has several days of cramps and small volume stools\par then llq abdom discomfort\par which in the past he has been concerned represented diverticultis\par but may just be his IBS\par \par and then, he feels better; and during that time, he also is flatulent, as if perhaps he had some retained gas, which is now being expelled\par \par re his reflux, rather good control on PPI treatment

## 2021-11-08 ENCOUNTER — RESULT REVIEW (OUTPATIENT)
Age: 61
End: 2021-11-08

## 2021-11-10 ENCOUNTER — RESULT REVIEW (OUTPATIENT)
Age: 61
End: 2021-11-10

## 2021-11-11 ENCOUNTER — APPOINTMENT (OUTPATIENT)
Dept: GASTROENTEROLOGY | Facility: HOSPITAL | Age: 61
End: 2021-11-11
Payer: COMMERCIAL

## 2021-11-11 PROCEDURE — 43239 EGD BIOPSY SINGLE/MULTIPLE: CPT

## 2021-11-11 PROCEDURE — 45380 COLONOSCOPY AND BIOPSY: CPT

## 2021-11-22 ENCOUNTER — APPOINTMENT (OUTPATIENT)
Dept: INTERNAL MEDICINE | Facility: CLINIC | Age: 61
End: 2021-11-22
Payer: COMMERCIAL

## 2021-11-22 VITALS
HEIGHT: 71 IN | SYSTOLIC BLOOD PRESSURE: 130 MMHG | OXYGEN SATURATION: 98 % | DIASTOLIC BLOOD PRESSURE: 80 MMHG | HEART RATE: 74 BPM | WEIGHT: 200 LBS | BODY MASS INDEX: 28 KG/M2 | TEMPERATURE: 97 F

## 2021-11-22 PROCEDURE — 99396 PREV VISIT EST AGE 40-64: CPT | Mod: 25

## 2021-11-22 PROCEDURE — 36415 COLL VENOUS BLD VENIPUNCTURE: CPT

## 2021-11-22 NOTE — ASSESSMENT
[FreeTextEntry1] : Patient appears to be doing well. We'll check labs and urinalysis. Patient is advised to reduce his carbohydrate intake and to repeat labs in 6 months. He will call for current lab results.

## 2021-11-22 NOTE — HISTORY OF PRESENT ILLNESS
[FreeTextEntry1] : Yearly exam. [de-identified] : 61-year-old male with a history of hypertension, GERD, hyperlipidemia and prediabetes. He has a history of diverticular surgery in 2005. His weight is stable at 200. He recently had a colonoscopy and EGD. He quit smoking in 2008. He he gets yearly low-dose CAT scans of the chest. Medications were reviewed. He generally feels well without chronic complaints.

## 2021-12-01 LAB
25(OH)D3 SERPL-MCNC: 24.1 NG/ML
ALBUMIN SERPL ELPH-MCNC: 4.7 G/DL
ALP BLD-CCNC: 59 U/L
ALT SERPL-CCNC: 23 U/L
ANION GAP SERPL CALC-SCNC: 15 MMOL/L
APPEARANCE: CLEAR
AST SERPL-CCNC: 18 U/L
BACTERIA: NEGATIVE
BASOPHILS # BLD AUTO: 0.06 K/UL
BASOPHILS NFR BLD AUTO: 0.5 %
BILIRUB SERPL-MCNC: 0.8 MG/DL
BILIRUBIN URINE: NEGATIVE
BLOOD URINE: NEGATIVE
BUN SERPL-MCNC: 13 MG/DL
CALCIUM SERPL-MCNC: 9.3 MG/DL
CHLORIDE SERPL-SCNC: 105 MMOL/L
CHOLEST SERPL-MCNC: 167 MG/DL
CO2 SERPL-SCNC: 22 MMOL/L
COLOR: YELLOW
CREAT SERPL-MCNC: 0.81 MG/DL
EOSINOPHIL # BLD AUTO: 0.44 K/UL
EOSINOPHIL NFR BLD AUTO: 3.7 %
ESTIMATED AVERAGE GLUCOSE: 123 MG/DL
GLUCOSE QUALITATIVE U: NEGATIVE
GLUCOSE SERPL-MCNC: 102 MG/DL
HBA1C MFR BLD HPLC: 5.9 %
HCT VFR BLD CALC: 49.4 %
HDLC SERPL-MCNC: 49 MG/DL
HGB BLD-MCNC: 15.5 G/DL
HYALINE CASTS: 1 /LPF
IMM GRANULOCYTES NFR BLD AUTO: 0.3 %
KETONES URINE: NEGATIVE
LDLC SERPL CALC-MCNC: 90 MG/DL
LEUKOCYTE ESTERASE URINE: NEGATIVE
LYMPHOCYTES # BLD AUTO: 3.83 K/UL
LYMPHOCYTES NFR BLD AUTO: 32 %
MAN DIFF?: NORMAL
MCHC RBC-ENTMCNC: 29.5 PG
MCHC RBC-ENTMCNC: 31.4 GM/DL
MCV RBC AUTO: 94.1 FL
MICROSCOPIC-UA: NORMAL
MONOCYTES # BLD AUTO: 0.68 K/UL
MONOCYTES NFR BLD AUTO: 5.7 %
NEUTROPHILS # BLD AUTO: 6.93 K/UL
NEUTROPHILS NFR BLD AUTO: 57.8 %
NITRITE URINE: NEGATIVE
NONHDLC SERPL-MCNC: 118 MG/DL
PH URINE: 5.5
PLATELET # BLD AUTO: 357 K/UL
POTASSIUM SERPL-SCNC: 4.6 MMOL/L
PROT SERPL-MCNC: 7.1 G/DL
PROTEIN URINE: NORMAL
PSA SERPL-MCNC: 0.95 NG/ML
RBC # BLD: 5.25 M/UL
RBC # FLD: 13.1 %
RED BLOOD CELLS URINE: 2 /HPF
SODIUM SERPL-SCNC: 142 MMOL/L
SPECIFIC GRAVITY URINE: 1.02
SQUAMOUS EPITHELIAL CELLS: 0 /HPF
TRIGL SERPL-MCNC: 142 MG/DL
TSH SERPL-ACNC: 0.62 UIU/ML
UROBILINOGEN URINE: NORMAL
WBC # FLD AUTO: 11.97 K/UL
WHITE BLOOD CELLS URINE: 0 /HPF

## 2021-12-20 ENCOUNTER — APPOINTMENT (OUTPATIENT)
Dept: GASTROENTEROLOGY | Facility: CLINIC | Age: 61
End: 2021-12-20

## 2021-12-29 ENCOUNTER — RESULT REVIEW (OUTPATIENT)
Age: 61
End: 2021-12-29

## 2022-01-04 ENCOUNTER — APPOINTMENT (OUTPATIENT)
Dept: THORACIC SURGERY | Facility: CLINIC | Age: 62
End: 2022-01-04
Payer: COMMERCIAL

## 2022-01-04 PROCEDURE — G0296 VISIT TO DETERM LDCT ELIG: CPT

## 2022-01-04 NOTE — PLAN
[Smoking Cessation] : smoking cessation [FreeTextEntry1] : Plan:\par \par -Low dose CT chest for lung cancer screening. KAILEY ALBRECHT ordered the low dose CT.      \par \par -Follow up with patient and his referring provider after his LDCT results have been reviewed by the multidisciplinary clinical team, if needed.\par \par -Encourage continued smoking abstinence.\par \par -Patient declines referral to CTC and CCX\par \par Should I screen? tool utilized. 6 Year risk of lung cancer is  1.3 %. Patient wishes to proceed with screening.\par \par Engaged in discussion regarding risks of screening during Covid-19 pandemic and precautions that are being used  to reduce exposure.\par \par Engaged in shared decision making with Mr. JAIMES . Answered all questions. He verbalized understanding and agreement. He knows to call back with and questions or concerns.\par

## 2022-01-04 NOTE — HISTORY OF PRESENT ILLNESS
[Former] : former smoker [_____ pack-years] : [unfilled] pack-years [TextBox_13] : Responded to reminder letter\par PCP Dr. Machuca\par \par BE JAIMES had telephonic visit for a review of eligibility and discussion of the Low dose CT lung cancer screening program. A telephonic visit occurred due to the patient not having access to a smart phone or a computer for an audio/visual visit.  The following was reviewed and confirmed the patient meets screening eligibility criteria.\par -Age 61 year\par Smoking Status:\par -Former smoker\par -Number of pack(s) per day: 1 PPD\par -Number of years smoked: 31\par -Number of pack years smokin\par -Number of years since quitting smokin\par -Quit year: \par \par Mr. JAIMES   denies any signs or symptoms of lung cancer including new cough, changing cough, hemoptysis, and unintentional weight loss. \par \par Mr. JAIMES  has history COPD and emphysema. He denies any personal history of lung cancer. Reports no lung cancer in a 1st degree relative. Reports no lung cancer in a 2nd degree relative. Denies any history of lung disease. Denies any  history of  occupational exposures. Has no exposure to 2nd hand smoke.\par \par  [TextBox_10] : 2008

## 2022-01-04 NOTE — ASSESSMENT
[Maintenance] : Maintenance: The patient has quit for more than 6 months [de-identified] : Has occasional urges to smoke but has resisted. He agrees to contact writer at any point he wishes to discuss smoking cessation support programs.

## 2022-01-04 NOTE — REASON FOR VISIT
[Home] : at home, [unfilled] , at the time of the visit. [Medical Office: ([facility])___] : at the medical office located in  [Verbal consent obtained from patient] : the patient, [unfilled] [Annual Follow-Up] : an annual follow-up visit [Review of Eligibility] : review of eligibility [Low-Dose CT Screening Discussion] : low-dose CT lung cancer screening discussion

## 2022-01-27 ENCOUNTER — RESULT REVIEW (OUTPATIENT)
Age: 62
End: 2022-01-27

## 2022-01-28 ENCOUNTER — NON-APPOINTMENT (OUTPATIENT)
Age: 62
End: 2022-01-28

## 2022-02-07 ENCOUNTER — NON-APPOINTMENT (OUTPATIENT)
Age: 62
End: 2022-02-07

## 2022-02-07 ENCOUNTER — APPOINTMENT (OUTPATIENT)
Dept: CARDIOLOGY | Facility: CLINIC | Age: 62
End: 2022-02-07
Payer: COMMERCIAL

## 2022-02-07 VITALS
WEIGHT: 206 LBS | DIASTOLIC BLOOD PRESSURE: 80 MMHG | RESPIRATION RATE: 12 BRPM | HEIGHT: 71 IN | BODY MASS INDEX: 28.84 KG/M2 | OXYGEN SATURATION: 98 % | HEART RATE: 58 BPM | SYSTOLIC BLOOD PRESSURE: 158 MMHG

## 2022-02-07 DIAGNOSIS — Z87.898 PERSONAL HISTORY OF OTHER SPECIFIED CONDITIONS: ICD-10-CM

## 2022-02-07 DIAGNOSIS — Z82.49 FAMILY HISTORY OF ISCHEMIC HEART DISEASE AND OTHER DISEASES OF THE CIRCULATORY SYSTEM: ICD-10-CM

## 2022-02-07 DIAGNOSIS — Z87.19 PERSONAL HISTORY OF OTHER DISEASES OF THE DIGESTIVE SYSTEM: ICD-10-CM

## 2022-02-07 PROCEDURE — 93000 ELECTROCARDIOGRAM COMPLETE: CPT

## 2022-02-07 PROCEDURE — 99204 OFFICE O/P NEW MOD 45 MIN: CPT

## 2022-02-07 RX ORDER — CETIRIZINE HYDROCHLORIDE 10 MG/1
10 TABLET, FILM COATED ORAL
Refills: 0 | Status: ACTIVE | COMMUNITY

## 2022-02-07 RX ORDER — ASCORBIC ACID 500 MG
TABLET ORAL
Refills: 0 | Status: ACTIVE | COMMUNITY

## 2022-02-07 RX ORDER — ASPIRIN 81 MG/1
81 TABLET ORAL
Refills: 0 | Status: ACTIVE | COMMUNITY

## 2022-02-07 RX ORDER — LISINOPRIL 10 MG/1
10 TABLET ORAL
Qty: 90 | Refills: 3 | Status: DISCONTINUED | COMMUNITY
Start: 2019-12-20 | End: 2022-02-07

## 2022-02-07 NOTE — ASSESSMENT
[FreeTextEntry1] : 62 yo male former smoker, COPD (mild), hypertension, CAD (incidental finding per 1/27/22 CT scan), and exertional chest and dyspnea which began on 1/28/22.\par ECG today shows sinus rhythm.\par \par Will perform echocardiogram to assess LV function and structural heart disease.\par WIll perform treadmill nuclear stress test for ischemic evaluation/risk stratification.\par After review of test results, will determine if further cardiac work-up or intervention is clinically indicated.\par Patient to continue aspirin 81 mg po daily.\par Will send Rx for NTG sl prn.\par \par Given reported uncontrolled HTN, will increase lisinopril from 10 mg to 20 mg po daily. \par Patient to call if BP readings are persistently elevated.\par \par LDL = 90 per 11/22/21 labs. \par Will continue rosuvastatin 10 mg po daily.

## 2022-02-07 NOTE — CARDIOLOGY SUMMARY
[de-identified] : \par 2/7/22 ECG Sinus rhythm, rate 67 bpm\par 1/31/22 ECG (at [place]): Sinus rhythm, rate 81 bpm

## 2022-02-08 ENCOUNTER — NON-APPOINTMENT (OUTPATIENT)
Age: 62
End: 2022-02-08

## 2022-02-11 ENCOUNTER — RESULT REVIEW (OUTPATIENT)
Age: 62
End: 2022-02-11

## 2022-02-12 ENCOUNTER — TRANSCRIPTION ENCOUNTER (OUTPATIENT)
Age: 62
End: 2022-02-12

## 2022-02-12 ENCOUNTER — NON-APPOINTMENT (OUTPATIENT)
Age: 62
End: 2022-02-12

## 2022-02-14 ENCOUNTER — APPOINTMENT (OUTPATIENT)
Dept: CARDIOLOGY | Facility: CLINIC | Age: 62
End: 2022-02-14
Payer: COMMERCIAL

## 2022-02-14 PROCEDURE — 36415 COLL VENOUS BLD VENIPUNCTURE: CPT

## 2022-02-16 LAB
ALBUMIN SERPL ELPH-MCNC: 4.8 G/DL
ALP BLD-CCNC: 53 U/L
ALT SERPL-CCNC: 21 U/L
ANION GAP SERPL CALC-SCNC: 15 MMOL/L
APTT BLD: 31.7 SEC
AST SERPL-CCNC: 19 U/L
BASOPHILS # BLD AUTO: 0.05 K/UL
BASOPHILS NFR BLD AUTO: 0.5 %
BILIRUB SERPL-MCNC: 1.2 MG/DL
BUN SERPL-MCNC: 11 MG/DL
CALCIUM SERPL-MCNC: 9.9 MG/DL
CHLORIDE SERPL-SCNC: 101 MMOL/L
CHOLEST SERPL-MCNC: 216 MG/DL
CO2 SERPL-SCNC: 24 MMOL/L
CREAT SERPL-MCNC: 0.76 MG/DL
EOSINOPHIL # BLD AUTO: 0.32 K/UL
EOSINOPHIL NFR BLD AUTO: 2.9 %
GLUCOSE SERPL-MCNC: 98 MG/DL
HCT VFR BLD CALC: 48.5 %
HDLC SERPL-MCNC: 56 MG/DL
HGB BLD-MCNC: 15.6 G/DL
IMM GRANULOCYTES NFR BLD AUTO: 0.4 %
INR PPP: 0.99 RATIO
LDLC SERPL CALC-MCNC: 127 MG/DL
LYMPHOCYTES # BLD AUTO: 3.22 K/UL
LYMPHOCYTES NFR BLD AUTO: 29.7 %
MAN DIFF?: NORMAL
MCHC RBC-ENTMCNC: 29.1 PG
MCHC RBC-ENTMCNC: 32.2 GM/DL
MCV RBC AUTO: 90.3 FL
MONOCYTES # BLD AUTO: 0.7 K/UL
MONOCYTES NFR BLD AUTO: 6.5 %
NEUTROPHILS # BLD AUTO: 6.52 K/UL
NEUTROPHILS NFR BLD AUTO: 60 %
NONHDLC SERPL-MCNC: 160 MG/DL
PLATELET # BLD AUTO: 294 K/UL
POTASSIUM SERPL-SCNC: 4.6 MMOL/L
PROT SERPL-MCNC: 7.2 G/DL
PT BLD: 11.8 SEC
RBC # BLD: 5.37 M/UL
RBC # FLD: 13 %
SODIUM SERPL-SCNC: 141 MMOL/L
TRIGL SERPL-MCNC: 162 MG/DL
WBC # FLD AUTO: 10.85 K/UL

## 2022-02-16 RX ORDER — ROSUVASTATIN CALCIUM 10 MG/1
10 TABLET, FILM COATED ORAL DAILY
Qty: 90 | Refills: 3 | Status: DISCONTINUED | COMMUNITY
Start: 2019-12-20 | End: 2022-02-16

## 2022-02-17 ENCOUNTER — APPOINTMENT (OUTPATIENT)
Dept: CARDIOLOGY | Facility: CLINIC | Age: 62
End: 2022-02-17
Payer: COMMERCIAL

## 2022-02-17 PROCEDURE — 93306 TTE W/DOPPLER COMPLETE: CPT

## 2022-02-22 ENCOUNTER — NON-APPOINTMENT (OUTPATIENT)
Age: 62
End: 2022-02-22

## 2022-02-23 ENCOUNTER — APPOINTMENT (OUTPATIENT)
Dept: INTERNAL MEDICINE | Facility: CLINIC | Age: 62
End: 2022-02-23
Payer: COMMERCIAL

## 2022-02-23 VITALS
DIASTOLIC BLOOD PRESSURE: 100 MMHG | HEART RATE: 81 BPM | SYSTOLIC BLOOD PRESSURE: 150 MMHG | BODY MASS INDEX: 28 KG/M2 | OXYGEN SATURATION: 97 % | WEIGHT: 200 LBS | HEIGHT: 71 IN | TEMPERATURE: 96.7 F

## 2022-02-23 PROCEDURE — 99214 OFFICE O/P EST MOD 30 MIN: CPT

## 2022-02-23 NOTE — ASSESSMENT
[FreeTextEntry1] : Patient with recent diagnosis of coronary artery disease status post stent placement. He remains quite anxious about his health. I believe is appropriate to keep the patient's lorazepam on an as needed basis at this time. He is advised that he should not be taking this regularly but she'll only take it on a p.r.n. basis.

## 2022-02-23 NOTE — HISTORY OF PRESENT ILLNESS
[FreeTextEntry1] : Frequent panic attacks. [de-identified] : On January 31 the patient developed shortness of breath chest tightness and chills. He had to go to the emergency room and workup was negative. The next day he saw cardiology. On February 11 he underwent a nuclear stress test which was abnormal. He was referred for cardiac catheterization  at which time a stent was placed. Since that time the patient states he is full of anxiety. He is having frequent panic attacks. There is no chest pain no shortness of breath but he feels extremely anxious about his health. He is worried about whether he'll be able to return to work. His repeat echocardiogram is normal. He has appointment with cardiology next Tuesday. He asked me for some medications to reduce his anxiety. He has never taken meds  previously for anxiety.

## 2022-03-01 ENCOUNTER — NON-APPOINTMENT (OUTPATIENT)
Age: 62
End: 2022-03-01

## 2022-03-01 ENCOUNTER — APPOINTMENT (OUTPATIENT)
Dept: CARDIOLOGY | Facility: CLINIC | Age: 62
End: 2022-03-01
Payer: COMMERCIAL

## 2022-03-01 ENCOUNTER — APPOINTMENT (OUTPATIENT)
Dept: INTERNAL MEDICINE | Facility: CLINIC | Age: 62
End: 2022-03-01
Payer: COMMERCIAL

## 2022-03-01 VITALS
BODY MASS INDEX: 28.28 KG/M2 | HEART RATE: 75 BPM | TEMPERATURE: 96.6 F | HEIGHT: 71 IN | OXYGEN SATURATION: 98 % | WEIGHT: 202 LBS

## 2022-03-01 VITALS
OXYGEN SATURATION: 98 % | HEART RATE: 70 BPM | WEIGHT: 202 LBS | SYSTOLIC BLOOD PRESSURE: 136 MMHG | BODY MASS INDEX: 28.17 KG/M2 | DIASTOLIC BLOOD PRESSURE: 70 MMHG

## 2022-03-01 VITALS — SYSTOLIC BLOOD PRESSURE: 130 MMHG | DIASTOLIC BLOOD PRESSURE: 80 MMHG

## 2022-03-01 DIAGNOSIS — Z87.898 PERSONAL HISTORY OF OTHER SPECIFIED CONDITIONS: ICD-10-CM

## 2022-03-01 PROCEDURE — 99213 OFFICE O/P EST LOW 20 MIN: CPT

## 2022-03-01 PROCEDURE — 93000 ELECTROCARDIOGRAM COMPLETE: CPT

## 2022-03-01 PROCEDURE — 99214 OFFICE O/P EST MOD 30 MIN: CPT

## 2022-03-01 NOTE — HISTORY OF PRESENT ILLNESS
[FreeTextEntry1] : Possible sinus infection [de-identified] : The past few weeks the patient is complaining of thick yellowish-green postnasal drip. In the morning he coughs up yellow to green sputum. Over the past few days he developed more pain over the right frontal region. He also has developed bloody nasal secretions from the right side. He has continual postnasal drip. He has a history of sinus infection when he was in his 30s. He takes Zyrtec without relief. He denies maxillary sinus pain. He denies history of chronic nasal congestion. He feels he has a sinus infection and would like treatment.

## 2022-03-01 NOTE — CARDIOLOGY SUMMARY
[de-identified] : \par 3/1/22 ECG: Sinus rhythm, rate 72 bpm\par 2/7/22 ECG Sinus rhythm, rate 67 bpm\par 1/31/22 ECG (at [place]): Sinus rhythm, rate 81 bpm\par  [de-identified] : \par 2/17/22 Echo: Normal LV size and systolic function, LVEF 61%. Upper normal LA volume index (34 ml/m2). Mild MV thickening. Trace MR. Trace TR. Normal PASP. Trace MA. Mildly dilated aortic root (3.8 cm).\par  [de-identified] : \par 2/18/22 Cardiac cath (at [facility]): \par 90% LPDA -> PCI with TANISHA (Skypoint stent)\par 60% distal RCA (co-dominant)\par LVEF 60%\par LVEDP 14 mmHg

## 2022-03-01 NOTE — ASSESSMENT
[FreeTextEntry1] : Patient with likely bacterial sinusitis. We'll treat with Augmentin for 10 days. Patient is to call me after one week for followup

## 2022-03-01 NOTE — ASSESSMENT
[FreeTextEntry1] : 60 yo male former smoker, with COPD, hypertension, and recently diagnosed CAD -> PCI of LPDA with TANISHA on 2/18/22 at [facility].\par ECG today shows sinus rhythm.\par \par Patient with improvement in his symptoms of exertional chest pain/dyspnea following his PCI on 2/18/22.\par Patient to return to work on 3/9/22 without restrictions.\par Will continue aspirin 81 mg po daily and clopidogrel 75 mg po daily. \par Will continue rosuvastatin.\par \par BP is currently adequately controlled.\par Will continue lisinopril 20 mg po daily. \par \par LDL = 127 per 2/14/22 labs. \par Will continue rosuvastatin 20 mg po daily at this time (increased from 10 mg on 2/16/22).\par Will repeat lipid panel at next follow-up in 3-4 months..\par \par Patient was advised to see his PMD or ENT for further evaluation/management of his sinus congestion/post-nasal drip.

## 2022-03-01 NOTE — HISTORY OF PRESENT ILLNESS
[FreeTextEntry1] : 62 yo male former smoker, with COPD, hypertension, and recently diagnosed CAD -> PCI of LPDA with TANISHA on 2/18/22 at [facility]. He presents today for follow-up. He reports that his chest pain has resolved following his PCI but still reports some exertional dyspnea but improved compared to his symptoms before PCI. Patient denies palpitations, syncope, edema, melena, hematochezia, or hematemesis. He mainly complains of sinus congestion and post-nasal drip at this time.\par \par PCP: Ruben Machuca

## 2022-03-14 ENCOUNTER — NON-APPOINTMENT (OUTPATIENT)
Age: 62
End: 2022-03-14

## 2022-03-16 ENCOUNTER — APPOINTMENT (OUTPATIENT)
Dept: CARDIOLOGY | Facility: CLINIC | Age: 62
End: 2022-03-16
Payer: COMMERCIAL

## 2022-03-16 ENCOUNTER — NON-APPOINTMENT (OUTPATIENT)
Age: 62
End: 2022-03-16

## 2022-03-16 VITALS
HEART RATE: 78 BPM | SYSTOLIC BLOOD PRESSURE: 144 MMHG | DIASTOLIC BLOOD PRESSURE: 86 MMHG | BODY MASS INDEX: 28.56 KG/M2 | HEIGHT: 71 IN | RESPIRATION RATE: 14 BRPM | OXYGEN SATURATION: 95 % | WEIGHT: 204 LBS

## 2022-03-16 PROCEDURE — 36415 COLL VENOUS BLD VENIPUNCTURE: CPT

## 2022-03-16 PROCEDURE — 99214 OFFICE O/P EST MOD 30 MIN: CPT

## 2022-03-16 PROCEDURE — 93000 ELECTROCARDIOGRAM COMPLETE: CPT

## 2022-03-16 NOTE — PHYSICAL EXAM
[Well Developed] : well developed [Well Nourished] : well nourished [No Acute Distress] : no acute distress [Normal Conjunctiva] : normal conjunctiva [Normal Venous Pressure] : normal venous pressure [No Carotid Bruit] : no carotid bruit [Normal S1, S2] : normal S1, S2 [No Murmur] : no murmur [No Rub] : no rub [No Gallop] : no gallop [Normal Gait] : normal gait [No Edema] : no edema [No Cyanosis] : no cyanosis [No Clubbing] : no clubbing [Moves all extremities] : moves all extremities [No Focal Deficits] : no focal deficits [Normal Speech] : normal speech [Alert and Oriented] : alert and oriented [Normal memory] : normal memory [Wheezing Bilaterally] : no wheezing was heard [Decreased Breath Sounds Bilaterally] : breath sounds were diminished over both lungs

## 2022-03-16 NOTE — ASSESSMENT
[FreeTextEntry1] : 62 yo male former smoker, with COPD, hypertension, and recently diagnosed CAD -> PCI of LPDA with TANISHA on 2/18/22 at [facility].\par ECG today shows sinus rhythm.\par \par Patient with recurrent exertional dyspnea despite initial improvement following his PCI on 2/18/22.\par Low suspicion for ischemic cause of his current symptoms given recent PCI and no acute ECG changes. Suspect that his symptoms may be due to progression of his COPD given poor air movement on lung auscultation today.\par Will check CBC, CMP, and lipid panel.\par Will continue aspirin 81 mg po daily, clopidogrel 75 mg po daily, rosuvastatin 20 mg po daily.\par Will add metoprolol succinate 25 mg po daily.\par Patient was advised to see Dr. Machuca to arrange for PFT for re-evaluation of his COPD.\par If PFTs are unremarkable and patient continues to report symptoms, will perform nuclear stress test for ischemic re-evaluation.\par \par BP is mildly elevated today.\par Will add metoprolol succinate 25 mg po daily.\par Will continue lisinopril 20 mg po daily. \par \par Currently on rosuvastatin 20 mg po daily for hyperlipidemia management (increased from 10 mg on 2/16/22).\par Will repeat lipid panel today.

## 2022-03-16 NOTE — CARDIOLOGY SUMMARY
[de-identified] : \par 3/16/22 ECG: Sinus rhythm, rate 79 bpm\par 3/1/22 ECG: Sinus rhythm, rate 72 bpm\par 2/7/22 ECG Sinus rhythm, rate 67 bpm\par 1/31/22 ECG (at [place]): Sinus rhythm, rate 81 bpm\par  [de-identified] : \par 2/11/22 Lexiscan Myoview (at [place]): Unable to reach target HR with treadmill and converted to Lexiscan nuclear stress test. No CP but dyspnea. No ECG changes. Small area of mild to moderate ischemia in mid to basal inferior region. Small area of mild basal anterolateral ischemia. Normal LV size and wall motion, LVEF 71%.\par  [de-identified] : \par 2/17/22 Echo: Normal LV size and systolic function, LVEF 61%. Upper normal LA volume index (34 ml/m2). Mild MV thickening. Trace MR. Trace TR. Normal PASP. Trace ME. Mildly dilated aortic root (3.8 cm).\par  [de-identified] : \par 2/18/22 Cardiac cath (at [facility]): \par 90% LPDA -> PCI with TANISHA (Skypoint stent)\par 60% distal RCA (co-dominant)\par LVEF 60%\par LVEDP 14 mmHg

## 2022-03-16 NOTE — HISTORY OF PRESENT ILLNESS
[FreeTextEntry1] : 62 yo male former smoker, with COPD, hypertension, and recently diagnosed CAD -> PCI of LPDA with TANISHA on 2/18/22 at [facility]. He presents today with recurrent exertional dyspnea despite initial improvement following his PCI on 2/18/22. He reports that he has been working this past week but reports recurrent dyspnea. Patient denies chest pain, palpitations, syncope, edema, melena, hematochezia, or hematemesis.\par \par PCP: Ruben Machuca

## 2022-03-17 ENCOUNTER — APPOINTMENT (OUTPATIENT)
Dept: PULMONOLOGY | Facility: CLINIC | Age: 62
End: 2022-03-17
Payer: COMMERCIAL

## 2022-03-17 VITALS
BODY MASS INDEX: 28.56 KG/M2 | HEART RATE: 65 BPM | WEIGHT: 204 LBS | HEIGHT: 71 IN | OXYGEN SATURATION: 98 % | SYSTOLIC BLOOD PRESSURE: 140 MMHG | DIASTOLIC BLOOD PRESSURE: 90 MMHG | TEMPERATURE: 96.8 F

## 2022-03-17 LAB
ALBUMIN SERPL ELPH-MCNC: 4.9 G/DL
ALP BLD-CCNC: 50 U/L
ALT SERPL-CCNC: 25 U/L
ANION GAP SERPL CALC-SCNC: 14 MMOL/L
AST SERPL-CCNC: 22 U/L
BASOPHILS # BLD AUTO: 0.06 K/UL
BASOPHILS NFR BLD AUTO: 0.6 %
BILIRUB SERPL-MCNC: 1.3 MG/DL
BUN SERPL-MCNC: 15 MG/DL
CALCIUM SERPL-MCNC: 9.1 MG/DL
CHLORIDE SERPL-SCNC: 106 MMOL/L
CHOLEST SERPL-MCNC: 149 MG/DL
CO2 SERPL-SCNC: 22 MMOL/L
CREAT SERPL-MCNC: 0.79 MG/DL
EGFR: 100 ML/MIN/1.73M2
EOSINOPHIL # BLD AUTO: 0.42 K/UL
EOSINOPHIL NFR BLD AUTO: 3.9 %
GLUCOSE SERPL-MCNC: 93 MG/DL
HCT VFR BLD CALC: 44.6 %
HDLC SERPL-MCNC: 47 MG/DL
HGB BLD-MCNC: 14.6 G/DL
IMM GRANULOCYTES NFR BLD AUTO: 0.3 %
LDLC SERPL CALC-MCNC: 78 MG/DL
LYMPHOCYTES # BLD AUTO: 3.01 K/UL
LYMPHOCYTES NFR BLD AUTO: 28.2 %
MAN DIFF?: NORMAL
MCHC RBC-ENTMCNC: 29.4 PG
MCHC RBC-ENTMCNC: 32.7 GM/DL
MCV RBC AUTO: 89.7 FL
MONOCYTES # BLD AUTO: 0.7 K/UL
MONOCYTES NFR BLD AUTO: 6.6 %
NEUTROPHILS # BLD AUTO: 6.44 K/UL
NEUTROPHILS NFR BLD AUTO: 60.4 %
NONHDLC SERPL-MCNC: 102 MG/DL
PLATELET # BLD AUTO: 319 K/UL
POTASSIUM SERPL-SCNC: 4.3 MMOL/L
PROT SERPL-MCNC: 7.1 G/DL
RBC # BLD: 4.97 M/UL
RBC # FLD: 13 %
SODIUM SERPL-SCNC: 142 MMOL/L
TRIGL SERPL-MCNC: 119 MG/DL
WBC # FLD AUTO: 10.66 K/UL

## 2022-03-17 PROCEDURE — 99214 OFFICE O/P EST MOD 30 MIN: CPT

## 2022-03-17 NOTE — PHYSICAL EXAM
[No Acute Distress] : no acute distress [Well Nourished] : well nourished [Well Developed] : well developed [Well-Appearing] : well-appearing [Normal Sclera/Conjunctiva] : normal sclera/conjunctiva [PERRL] : pupils equal round and reactive to light [EOMI] : extraocular movements intact [Normal Outer Ear/Nose] : the outer ears and nose were normal in appearance [Normal Oropharynx] : the oropharynx was normal [No JVD] : no jugular venous distention [No Lymphadenopathy] : no lymphadenopathy [Supple] : supple [Thyroid Normal, No Nodules] : the thyroid was normal and there were no nodules present [No Respiratory Distress] : no respiratory distress  [No Accessory Muscle Use] : no accessory muscle use [Normal Rate] : normal rate  [Regular Rhythm] : with a regular rhythm [Normal S1, S2] : normal S1 and S2 [No Murmur] : no murmur heard [No Carotid Bruits] : no carotid bruits [No Abdominal Bruit] : a ~M bruit was not heard ~T in the abdomen [No Varicosities] : no varicosities [Pedal Pulses Present] : the pedal pulses are present [No Edema] : there was no peripheral edema [No Palpable Aorta] : no palpable aorta [No Extremity Clubbing/Cyanosis] : no extremity clubbing/cyanosis [Soft] : abdomen soft [Non Tender] : non-tender [Non-distended] : non-distended [No Masses] : no abdominal mass palpated [No HSM] : no HSM [Normal Bowel Sounds] : normal bowel sounds [Normal Posterior Cervical Nodes] : no posterior cervical lymphadenopathy [Normal Anterior Cervical Nodes] : no anterior cervical lymphadenopathy [No CVA Tenderness] : no CVA  tenderness [No Spinal Tenderness] : no spinal tenderness [No Joint Swelling] : no joint swelling [Grossly Normal Strength/Tone] : grossly normal strength/tone [No Rash] : no rash [Coordination Grossly Intact] : coordination grossly intact [No Focal Deficits] : no focal deficits [Normal Gait] : normal gait [Normal Affect] : the affect was normal [Normal Insight/Judgement] : insight and judgment were intact [de-identified] : sl decreased bs's

## 2022-03-17 NOTE — HISTORY OF PRESENT ILLNESS
[FreeTextEntry1] : Evaluation of lung function. [de-identified] : One week ago patient had an episode after working all day lower sternal discomfort radiating to the back with an episode of sweating. It lasted about 10 minutes. He was seen by cardiology this week and was told that he could have an element of COPD. Patient underwent cardiac stent one month ago. He is a former smoker but quit 8 years ago. He had a low dose CAT scan of the chest in January of this year. He generally has no shortness of breath walking on level ground with going up a flight of stairs. 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 He was told by cardiology that he might have COPD and he should be evaluated.

## 2022-03-17 NOTE — ASSESSMENT
[FreeTextEntry1] : I agree that patient likely has a mild form of COPD. However his exercise tolerance is excellent. He does physical work without difficulty. The etiology of his chest pain episode one week ago is unclear. I doubt it is related to lung pathology. I will get a pulmonary function test and evaluate the patient. I will then discuss with cardiology.

## 2022-03-25 ENCOUNTER — NON-APPOINTMENT (OUTPATIENT)
Age: 62
End: 2022-03-25

## 2022-03-29 ENCOUNTER — APPOINTMENT (OUTPATIENT)
Dept: CARDIOLOGY | Facility: CLINIC | Age: 62
End: 2022-03-29
Payer: COMMERCIAL

## 2022-03-29 ENCOUNTER — NON-APPOINTMENT (OUTPATIENT)
Age: 62
End: 2022-03-29

## 2022-03-29 VITALS
BODY MASS INDEX: 28.59 KG/M2 | SYSTOLIC BLOOD PRESSURE: 156 MMHG | HEART RATE: 62 BPM | OXYGEN SATURATION: 98 % | WEIGHT: 205 LBS | DIASTOLIC BLOOD PRESSURE: 92 MMHG | RESPIRATION RATE: 12 BRPM

## 2022-03-29 PROCEDURE — 99214 OFFICE O/P EST MOD 30 MIN: CPT

## 2022-03-29 PROCEDURE — 93000 ELECTROCARDIOGRAM COMPLETE: CPT

## 2022-03-29 RX ORDER — LISINOPRIL 20 MG/1
20 TABLET ORAL DAILY
Qty: 90 | Refills: 2 | Status: DISCONTINUED | COMMUNITY
Start: 2022-02-07 | End: 2022-03-29

## 2022-03-29 NOTE — HISTORY OF PRESENT ILLNESS
[FreeTextEntry1] : 60 yo male former smoker, with COPD, hypertension, and recently diagnosed CAD -> PCI of LPDA with TANISHA on 2/18/22 at [facility]. He presents today with reported uncontrolled hypertension and exertional dyspnea. Patient denies chest pain, palpitations, syncope, edema, melena, hematochezia, or hematemesis. Patient reports that he reduced his metoprolol succinate from 25 mg po daily to 12.5 mg po daily due to positional lightheadedness.\par \par PCP: Ruben Machuca

## 2022-03-29 NOTE — ASSESSMENT
[FreeTextEntry1] : 62 yo male former smoker, with COPD, hypertension, and recently diagnosed CAD -> PCI of LPDA with TANISHA on 2/18/22 at [facility].\par ECG today shows sinus rhythm.\par \par Patient with recurrent exertional dyspnea despite initial improvement following his PCI on 2/18/22.\par Low suspicion for ischemic cause of his current symptoms given recent PCI and no acute ECG changes.\par His symptoms may be due to uncontrolled HTN, which is exacerbated with physical exertion.\par Will continue metoprolol succinate 12.5 mg po daily, aspirin 81 mg po daily, clopidogrel 75 mg po daily, and rosuvastatin 20 mg po daily.\par Will increase lisinopril to 40 mg po daily. RTC in 1 month for BP check and labs.\par Patient was instructed to monitor BP and call if BP readings are persistently elevated.\par \par LDL 78 per 3/16/22 labs. Will continue rosuvastatin 20 mg po daily for hyperlipidemia management.

## 2022-03-29 NOTE — CARDIOLOGY SUMMARY
[de-identified] : \par 3/29/22 ECG: Sinus rhythm, rate 65 bpm\par 3/16/22 ECG: Sinus rhythm, rate 79 bpm\par 3/1/22 ECG: Sinus rhythm, rate 72 bpm\par 2/7/22 ECG Sinus rhythm, rate 67 bpm\par 1/31/22 ECG (at [place]): Sinus rhythm, rate 81 bpm\par  [de-identified] : \par 2/11/22 Lexiscan Myoview (at [place]): Unable to reach target HR with treadmill and converted to Lexiscan nuclear stress test. No CP but dyspnea. No ECG changes. Small area of mild to moderate ischemia in mid to basal inferior region. Small area of mild basal anterolateral ischemia. Normal LV size and wall motion, LVEF 71%.\par  [de-identified] : \par 2/17/22 Echo: Normal LV size and systolic function, LVEF 61%. Upper normal LA volume index (34 ml/m2). Mild MV thickening. Trace MR. Trace TR. Normal PASP. Trace IA. Mildly dilated aortic root (3.8 cm).\par  [de-identified] : \par 2/18/22 Cardiac cath (at [facility]): \par 90% LPDA -> PCI with TANISHA (Skypoint stent)\par 60% distal RCA (co-dominant)\par LVEF 60%\par LVEDP 14 mmHg

## 2022-03-29 NOTE — PHYSICAL EXAM
[Well Developed] : well developed [Well Nourished] : well nourished [No Acute Distress] : no acute distress [Normal Conjunctiva] : normal conjunctiva [Normal Venous Pressure] : normal venous pressure [No Carotid Bruit] : no carotid bruit [Normal S1, S2] : normal S1, S2 [No Murmur] : no murmur [No Rub] : no rub [No Gallop] : no gallop [Wheezing Bilaterally] : no wheezing was heard [Decreased Breath Sounds Bilaterally] : breath sounds were diminished over both lungs [Normal Gait] : normal gait [No Edema] : no edema [No Cyanosis] : no cyanosis [No Clubbing] : no clubbing [Moves all extremities] : moves all extremities [No Focal Deficits] : no focal deficits [Normal Speech] : normal speech [Alert and Oriented] : alert and oriented [Normal memory] : normal memory

## 2022-03-30 ENCOUNTER — APPOINTMENT (OUTPATIENT)
Dept: CARDIOLOGY | Facility: CLINIC | Age: 62
End: 2022-03-30

## 2022-04-25 ENCOUNTER — APPOINTMENT (OUTPATIENT)
Dept: CARDIOLOGY | Facility: CLINIC | Age: 62
End: 2022-04-25

## 2022-05-09 ENCOUNTER — APPOINTMENT (OUTPATIENT)
Dept: CARDIOLOGY | Facility: CLINIC | Age: 62
End: 2022-05-09
Payer: COMMERCIAL

## 2022-05-09 PROCEDURE — 36415 COLL VENOUS BLD VENIPUNCTURE: CPT

## 2022-05-13 LAB
ALBUMIN SERPL ELPH-MCNC: 4.7 G/DL
ALP BLD-CCNC: 56 U/L
ALT SERPL-CCNC: 22 U/L
ANION GAP SERPL CALC-SCNC: 15 MMOL/L
AST SERPL-CCNC: 21 U/L
BILIRUB SERPL-MCNC: 0.8 MG/DL
BUN SERPL-MCNC: 15 MG/DL
CALCIUM SERPL-MCNC: 9.4 MG/DL
CHLORIDE SERPL-SCNC: 103 MMOL/L
CHOLEST SERPL-MCNC: 175 MG/DL
CO2 SERPL-SCNC: 24 MMOL/L
CREAT SERPL-MCNC: 0.79 MG/DL
EGFR: 100 ML/MIN/1.73M2
GLUCOSE SERPL-MCNC: 91 MG/DL
HDLC SERPL-MCNC: 49 MG/DL
LDLC SERPL CALC-MCNC: 90 MG/DL
NONHDLC SERPL-MCNC: 126 MG/DL
POTASSIUM SERPL-SCNC: 4.5 MMOL/L
PROT SERPL-MCNC: 6.9 G/DL
SODIUM SERPL-SCNC: 142 MMOL/L
TRIGL SERPL-MCNC: 179 MG/DL

## 2022-05-13 RX ORDER — METOPROLOL SUCCINATE 25 MG/1
25 TABLET, EXTENDED RELEASE ORAL
Qty: 45 | Refills: 2 | Status: DISCONTINUED | COMMUNITY
Start: 2022-03-16 | End: 2022-05-13

## 2022-05-16 ENCOUNTER — NON-APPOINTMENT (OUTPATIENT)
Age: 62
End: 2022-05-16

## 2022-05-24 ENCOUNTER — APPOINTMENT (OUTPATIENT)
Dept: INTERNAL MEDICINE | Facility: CLINIC | Age: 62
End: 2022-05-24
Payer: COMMERCIAL

## 2022-05-24 VITALS
OXYGEN SATURATION: 99 % | HEIGHT: 71 IN | SYSTOLIC BLOOD PRESSURE: 140 MMHG | DIASTOLIC BLOOD PRESSURE: 90 MMHG | TEMPERATURE: 96.7 F | HEART RATE: 74 BPM | WEIGHT: 205 LBS | BODY MASS INDEX: 28.7 KG/M2

## 2022-05-24 PROCEDURE — 99214 OFFICE O/P EST MOD 30 MIN: CPT

## 2022-05-24 NOTE — HISTORY OF PRESENT ILLNESS
[FreeTextEntry1] : Followup recent covid infection. [de-identified] : Patient tested positive for covid on May 14. He was treated with Paxlovid,  however he continues to have dry cough and marked fatigue. He also had diarrhea which may be due to the use of Paxlovid. 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 He denies fever or chills. He has a persistent dry cough. Current O2 sat 97 on room air.

## 2022-05-24 NOTE — PHYSICAL EXAM
[No Acute Distress] : no acute distress [Well Nourished] : well nourished [Well Developed] : well developed [Well-Appearing] : well-appearing [Normal Sclera/Conjunctiva] : normal sclera/conjunctiva [PERRL] : pupils equal round and reactive to light [EOMI] : extraocular movements intact [Normal Outer Ear/Nose] : the outer ears and nose were normal in appearance [Normal Oropharynx] : the oropharynx was normal [No JVD] : no jugular venous distention [No Lymphadenopathy] : no lymphadenopathy [Supple] : supple [Thyroid Normal, No Nodules] : the thyroid was normal and there were no nodules present [No Respiratory Distress] : no respiratory distress  [No Accessory Muscle Use] : no accessory muscle use [Clear to Auscultation] : lungs were clear to auscultation bilaterally [Normal Rate] : normal rate  [Regular Rhythm] : with a regular rhythm [Normal S1, S2] : normal S1 and S2 [No Murmur] : no murmur heard [No Carotid Bruits] : no carotid bruits [No Abdominal Bruit] : a ~M bruit was not heard ~T in the abdomen [No Varicosities] : no varicosities [Pedal Pulses Present] : the pedal pulses are present [No Edema] : there was no peripheral edema [No Palpable Aorta] : no palpable aorta [No Extremity Clubbing/Cyanosis] : no extremity clubbing/cyanosis [Soft] : abdomen soft [Non Tender] : non-tender [Non-distended] : non-distended [No Masses] : no abdominal mass palpated [No HSM] : no HSM [Normal Bowel Sounds] : normal bowel sounds [Normal Posterior Cervical Nodes] : no posterior cervical lymphadenopathy [Normal Anterior Cervical Nodes] : no anterior cervical lymphadenopathy [No Joint Swelling] : no joint swelling [Grossly Normal Strength/Tone] : grossly normal strength/tone [No Rash] : no rash [No Focal Deficits] : no focal deficits [Normal Gait] : normal gait [Normal Affect] : the affect was normal [Normal Insight/Judgement] : insight and judgment were intact

## 2022-05-24 NOTE — ASSESSMENT
[FreeTextEntry1] : Patient with Covid infection on May 14. He has persistent symptoms of marked fatigue. Lung fields are clear and O2 sat 97 on room active. Patient is advised not to work for the following week and to return to work without restrictions on 5-31.

## 2022-06-08 ENCOUNTER — NON-APPOINTMENT (OUTPATIENT)
Age: 62
End: 2022-06-08

## 2022-06-08 ENCOUNTER — APPOINTMENT (OUTPATIENT)
Dept: CARDIOLOGY | Facility: CLINIC | Age: 62
End: 2022-06-08
Payer: COMMERCIAL

## 2022-06-08 VITALS
RESPIRATION RATE: 12 BRPM | HEART RATE: 71 BPM | BODY MASS INDEX: 28.84 KG/M2 | HEIGHT: 71 IN | DIASTOLIC BLOOD PRESSURE: 78 MMHG | WEIGHT: 206 LBS | SYSTOLIC BLOOD PRESSURE: 138 MMHG | OXYGEN SATURATION: 96 %

## 2022-06-08 PROCEDURE — 99214 OFFICE O/P EST MOD 30 MIN: CPT

## 2022-06-08 PROCEDURE — 93000 ELECTROCARDIOGRAM COMPLETE: CPT

## 2022-06-08 RX ORDER — ROSUVASTATIN CALCIUM 20 MG/1
20 TABLET, FILM COATED ORAL
Qty: 90 | Refills: 3 | Status: DISCONTINUED | COMMUNITY
Start: 2022-02-16 | End: 2022-06-08

## 2022-06-08 NOTE — CARDIOLOGY SUMMARY
[de-identified] : \par 6/8/22 ECG: Sinus rhythm, rate 70 bpm\par 3/29/22 ECG: Sinus rhythm, rate 65 bpm\par 3/16/22 ECG: Sinus rhythm, rate 79 bpm\par 3/1/22 ECG: Sinus rhythm, rate 72 bpm\par 2/7/22 ECG Sinus rhythm, rate 67 bpm\par 1/31/22 ECG (at [place]): Sinus rhythm, rate 81 bpm\par  [de-identified] : \par 2/11/22 Lexiscan Myoview (at [place]): Unable to reach target HR with treadmill and converted to Lexiscan nuclear stress test. No CP but dyspnea. No ECG changes. Small area of mild to moderate ischemia in mid to basal inferior region. Small area of mild basal anterolateral ischemia. Normal LV size and wall motion, LVEF 71%.\par  [de-identified] : \par 2/17/22 Echo: Normal LV size and systolic function, LVEF 61%. Upper normal LA volume index (34 ml/m2). Mild MV thickening. Trace MR. Trace TR. Normal PASP. Trace KS. Mildly dilated aortic root (3.8 cm).\par  [de-identified] : \par 2/18/22 Cardiac cath (at [facility]): \par 90% LPDA -> PCI with TANISHA (Skypoint stent)\par 60% distal RCA (co-dominant)\par LVEF 60%\par LVEDP 14 mmHg

## 2022-06-08 NOTE — REASON FOR VISIT
[Hypertension] : hypertension [Coronary Artery Disease] : coronary artery disease [Hyperlipidemia] : hyperlipidemia [Other: ____] : [unfilled]

## 2022-06-08 NOTE — HISTORY OF PRESENT ILLNESS
[FreeTextEntry1] : 60 yo male former smoker, with COPD, hypertension, and recently diagnosed CAD -> PCI of LPDA with TANISHA on 2/18/22 at [facility]. He presents today for follow-up. He reports elevated BP readings at home intermittently with SBP usually in upper 130s but occasionally 160s. Patient denies exertional chest pain, palpitations, syncope, edema, melena, hematochezia, or hematemesis. He discontinued metoprolol 12.5 mg po daily due to positional lightheadedness, which has improved while off this medication. He does report intermittent fatigue since having COVID-19 on 5/16/22 for which he was treated with Paxlovid.\par \par PCP: Ruben Machuca

## 2022-06-08 NOTE — ASSESSMENT
[FreeTextEntry1] : 60 yo male former smoker, with COPD, hypertension, and recently diagnosed CAD -> PCI of LPDA with TANISHA on 2/18/22 at [facility].\par ECG today shows sinus rhythm.\par \par Patient is clinically stable from CAD standpoint.\par Will continue metoprolol succinate 12.5 mg po daily, aspirin 81 mg po daily, clopidogrel 75 mg po daily, and rosuvastatin.\par \par BP is not adequately controlled per reported BP readings at home and mildly elevated BP today in office.\par Will continue lisinopril 40 mg po daily, but add amlodipine 2.5 mg po daily for additional BP control.\par RTC in 3 month for BP check and labs.\par Patient was instructed to monitor BP and call if BP readings are persistently elevated.\par \par LDL 90 per 5/9/22 labs. \par Will increase rosuvastatin to 40 mg po daily.\par Will repeat lipid panel at follow-up in 3 months.\par

## 2022-08-17 ENCOUNTER — APPOINTMENT (OUTPATIENT)
Dept: CARDIOLOGY | Facility: CLINIC | Age: 62
End: 2022-08-17

## 2022-08-17 VITALS
OXYGEN SATURATION: 97 % | BODY MASS INDEX: 28.45 KG/M2 | SYSTOLIC BLOOD PRESSURE: 130 MMHG | WEIGHT: 204 LBS | DIASTOLIC BLOOD PRESSURE: 80 MMHG | HEART RATE: 75 BPM | RESPIRATION RATE: 12 BRPM

## 2022-08-17 PROCEDURE — 36415 COLL VENOUS BLD VENIPUNCTURE: CPT

## 2022-08-17 PROCEDURE — 99214 OFFICE O/P EST MOD 30 MIN: CPT

## 2022-08-17 NOTE — ASSESSMENT
[FreeTextEntry1] : 60 yo male former smoker, with COPD, hypertension, and recently diagnosed CAD -> PCI of LPDA with TANISHA on 2/18/22 at [facility].\par \par BP is adequately controlled per BP reading in the office today. However, patient reported elevated BP earlier this morning. Possibly due to increased sodium intake over the past weekend. \par Will continue lisinopril 40 mg po daily and amlodipine 2.5 mg po daily at this time.\par Patient was instructed to monitor BP over the next week and call if BP readings are persistently elevated.\par Will check CBC and CMP today.\par \par Patient is clinically stable from CAD standpoint.\par Will continue metoprolol succinate 12.5 mg po daily, aspirin 81 mg po daily, clopidogrel 75 mg po daily, and rosuvastatin.\par \par LDL 90 per 5/9/22 labs. \par Will repeat lipid panel today.\par Will continue rosuvastatin to 40 mg po daily.\par

## 2022-08-17 NOTE — CARDIOLOGY SUMMARY
[de-identified] : \par 6/8/22 ECG: Sinus rhythm, rate 70 bpm\par 3/29/22 ECG: Sinus rhythm, rate 65 bpm\par 3/16/22 ECG: Sinus rhythm, rate 79 bpm\par 3/1/22 ECG: Sinus rhythm, rate 72 bpm\par 2/7/22 ECG Sinus rhythm, rate 67 bpm\par 1/31/22 ECG (at [place]): Sinus rhythm, rate 81 bpm\par  [de-identified] : \par 2/11/22 Lexiscan Myoview (at [place]): Unable to reach target HR with treadmill and converted to Lexiscan nuclear stress test. No CP but dyspnea. No ECG changes. Small area of mild to moderate ischemia in mid to basal inferior region. Small area of mild basal anterolateral ischemia. Normal LV size and wall motion, LVEF 71%.\par  [de-identified] : \par 2/17/22 Echo: Normal LV size and systolic function, LVEF 61%. Upper normal LA volume index (34 ml/m2). Mild MV thickening. Trace MR. Trace TR. Normal PASP. Trace MT. Mildly dilated aortic root (3.8 cm).\par  [de-identified] : \par 2/18/22 Cardiac cath (at [facility]): \par 90% LPDA -> PCI with TANISHA (Skypoint stent)\par 60% distal RCA (co-dominant)\par LVEF 60%\par LVEDP 14 mmHg

## 2022-08-17 NOTE — PHYSICAL EXAM
[Well Developed] : well developed [Well Nourished] : well nourished [No Acute Distress] : no acute distress [Normal Conjunctiva] : normal conjunctiva [Normal Venous Pressure] : normal venous pressure [No Carotid Bruit] : no carotid bruit [Normal S1, S2] : normal S1, S2 [No Murmur] : no murmur [No Rub] : no rub [No Gallop] : no gallop [Wheezing Bilaterally] : no wheezing was heard [Decreased Breath Sounds Bilaterally] : breath sounds were diminished over both lungs [No Edema] : no edema [No Cyanosis] : no cyanosis [No Clubbing] : no clubbing [Moves all extremities] : moves all extremities [No Focal Deficits] : no focal deficits [Normal Speech] : normal speech [Alert and Oriented] : alert and oriented [Normal memory] : normal memory

## 2022-08-17 NOTE — HISTORY OF PRESENT ILLNESS
[FreeTextEntry1] : 60 yo male former smoker, with COPD, hypertension, and recently diagnosed CAD -> PCI of LPDA with TANISHA on 2/18/22 at [facility]. He presents today for BP management. He reports that his SBP was elevated to 160s this morning and 150s when he checked it again 10 minutes later before he went to work. However, his SBP later this afternoon was in 120s. He reports that he may have had increased sodium intake over the weekend, but has been compliant with his meds. Patient denies chest pain, dyspnea, palpitations, syncope, edema, melena, hematochezia, or hematemesis.\par \par PCP: Ruben Machuca

## 2022-08-18 LAB
ALBUMIN SERPL ELPH-MCNC: 4.7 G/DL
ALP BLD-CCNC: 51 U/L
ALT SERPL-CCNC: 26 U/L
ANION GAP SERPL CALC-SCNC: 14 MMOL/L
AST SERPL-CCNC: 20 U/L
BASOPHILS # BLD AUTO: 0.05 K/UL
BASOPHILS NFR BLD AUTO: 0.5 %
BILIRUB SERPL-MCNC: 1.2 MG/DL
BUN SERPL-MCNC: 13 MG/DL
CALCIUM SERPL-MCNC: 9.3 MG/DL
CHLORIDE SERPL-SCNC: 104 MMOL/L
CHOLEST SERPL-MCNC: 175 MG/DL
CO2 SERPL-SCNC: 23 MMOL/L
CREAT SERPL-MCNC: 0.73 MG/DL
EGFR: 103 ML/MIN/1.73M2
EOSINOPHIL # BLD AUTO: 0.34 K/UL
EOSINOPHIL NFR BLD AUTO: 3.1 %
GLUCOSE SERPL-MCNC: 97 MG/DL
HCT VFR BLD CALC: 46.8 %
HDLC SERPL-MCNC: 53 MG/DL
HGB BLD-MCNC: 15.2 G/DL
IMM GRANULOCYTES NFR BLD AUTO: 0.4 %
LDLC SERPL CALC-MCNC: 98 MG/DL
LYMPHOCYTES # BLD AUTO: 3.27 K/UL
LYMPHOCYTES NFR BLD AUTO: 29.8 %
MAN DIFF?: NORMAL
MCHC RBC-ENTMCNC: 29 PG
MCHC RBC-ENTMCNC: 32.5 GM/DL
MCV RBC AUTO: 89.1 FL
MONOCYTES # BLD AUTO: 0.72 K/UL
MONOCYTES NFR BLD AUTO: 6.6 %
NEUTROPHILS # BLD AUTO: 6.54 K/UL
NEUTROPHILS NFR BLD AUTO: 59.6 %
NONHDLC SERPL-MCNC: 122 MG/DL
PLATELET # BLD AUTO: 315 K/UL
POTASSIUM SERPL-SCNC: 4.4 MMOL/L
PROT SERPL-MCNC: 7.1 G/DL
RBC # BLD: 5.25 M/UL
RBC # FLD: 13.3 %
SODIUM SERPL-SCNC: 141 MMOL/L
TRIGL SERPL-MCNC: 119 MG/DL
WBC # FLD AUTO: 10.96 K/UL

## 2022-11-16 ENCOUNTER — NON-APPOINTMENT (OUTPATIENT)
Age: 62
End: 2022-11-16

## 2022-11-16 ENCOUNTER — APPOINTMENT (OUTPATIENT)
Dept: CARDIOLOGY | Facility: CLINIC | Age: 62
End: 2022-11-16

## 2022-11-16 VITALS
HEIGHT: 71 IN | RESPIRATION RATE: 12 BRPM | SYSTOLIC BLOOD PRESSURE: 113 MMHG | HEART RATE: 67 BPM | OXYGEN SATURATION: 98 % | DIASTOLIC BLOOD PRESSURE: 77 MMHG | BODY MASS INDEX: 28.42 KG/M2 | WEIGHT: 203 LBS

## 2022-11-16 PROCEDURE — 99214 OFFICE O/P EST MOD 30 MIN: CPT

## 2022-11-16 PROCEDURE — 93000 ELECTROCARDIOGRAM COMPLETE: CPT

## 2022-11-16 RX ORDER — AMLODIPINE BESYLATE 2.5 MG/1
2.5 TABLET ORAL DAILY
Qty: 90 | Refills: 3 | Status: DISCONTINUED | COMMUNITY
Start: 2022-06-08 | End: 2022-11-16

## 2022-11-16 NOTE — HISTORY OF PRESENT ILLNESS
[FreeTextEntry1] : 63 yo male former smoker, with COPD, hypertension, and recently diagnosed CAD -> PCI of LPDA with TANISHA on 2/18/22 at [facility]. He presents today for evaluation of non-exertional left sided upper pectoral chest discomfort and upper back pain, which began over the weekend. He reports that it is exacerbated with movement of his shoulder. Patient denies dyspnea, palpitations, syncope, edema, melena, hematochezia, or hematemesis.\par \par PCP: Ruben Machuca

## 2022-11-16 NOTE — CARDIOLOGY SUMMARY
[de-identified] : \par 11/16/22 ECG: Sinus rhythm, rate 65 bpm\par 6/8/22 ECG: Sinus rhythm, rate 70 bpm\par 3/29/22 ECG: Sinus rhythm, rate 65 bpm\par 3/16/22 ECG: Sinus rhythm, rate 79 bpm\par 3/1/22 ECG: Sinus rhythm, rate 72 bpm\par 2/7/22 ECG Sinus rhythm, rate 67 bpm\par 1/31/22 ECG (at [place]): Sinus rhythm, rate 81 bpm\par  [de-identified] : \par 2/11/22 Lexiscan Myoview (at [place]): Unable to reach target HR with treadmill and converted to Lexiscan nuclear stress test. No CP but dyspnea. No ECG changes. Small area of mild to moderate ischemia in mid to basal inferior region. Small area of mild basal anterolateral ischemia. Normal LV size and wall motion, LVEF 71%.\par  [de-identified] : \par 2/17/22 Echo: Normal LV size and systolic function, LVEF 61%. Upper normal LA volume index (34 ml/m2). Mild MV thickening. Trace MR. Trace TR. Normal PASP. Trace UT. Mildly dilated aortic root (3.8 cm).\par  [de-identified] : \par 2/18/22 Cardiac cath (at [facility]): \par 90% LPDA -> PCI with TANISHA (Skypoint stent)\par 60% distal RCA (co-dominant)\par LVEF 60%\par LVEDP 14 mmHg

## 2022-11-16 NOTE — ASSESSMENT
[FreeTextEntry1] : 63 yo male former smoker, with COPD, hypertension, and recently diagnosed CAD -> PCI of LPDA with TANISHA on 2/18/22 at [facility].\par \par Patient with non-exertional left sided chest pain which began over the weekend.\par ECG today did not demonstrate ischemic ST-T changes.\par Suspect that his discomfort is musculoskeletal in nature.\par Will continue to monitor at this time. Should he develop exertional chest pain/dyspnea will repeat nuclear stress test at that time.\par Will continue aspirin 81 mg po daily, clopidogrel 75 mg po daily, amlodipine, and rosuvastatin.\par \par BP is adequately controlled.\par Will continue lisinopril 40 mg po daily and amlodipine.5 mg po daily.\par \par LDL 98 per 8/2022 labs.\par Will continue rosuvastatin to 40 mg po daily.\par

## 2022-12-09 ENCOUNTER — APPOINTMENT (OUTPATIENT)
Dept: FAMILY MEDICINE | Facility: CLINIC | Age: 62
End: 2022-12-09

## 2023-01-04 ENCOUNTER — APPOINTMENT (OUTPATIENT)
Dept: FAMILY MEDICINE | Facility: CLINIC | Age: 63
End: 2023-01-04
Payer: COMMERCIAL

## 2023-01-04 VITALS
DIASTOLIC BLOOD PRESSURE: 78 MMHG | SYSTOLIC BLOOD PRESSURE: 130 MMHG | HEIGHT: 71 IN | HEART RATE: 77 BPM | OXYGEN SATURATION: 97 %

## 2023-01-04 VITALS — WEIGHT: 201 LBS | BODY MASS INDEX: 28.03 KG/M2

## 2023-01-04 DIAGNOSIS — R06.02 SHORTNESS OF BREATH: ICD-10-CM

## 2023-01-04 DIAGNOSIS — M79.671 PAIN IN RIGHT FOOT: ICD-10-CM

## 2023-01-04 DIAGNOSIS — M72.2 PLANTAR FASCIAL FIBROMATOSIS: ICD-10-CM

## 2023-01-04 DIAGNOSIS — K57.92 DIVERTICULITIS OF INTESTINE, PART UNSPECIFIED, W/OUT PERFORATION OR ABSCESS W/OUT BLEEDING: ICD-10-CM

## 2023-01-04 DIAGNOSIS — N52.8 OTHER MALE ERECTILE DYSFUNCTION: ICD-10-CM

## 2023-01-04 DIAGNOSIS — Z87.19 PERSONAL HISTORY OF OTHER DISEASES OF THE DIGESTIVE SYSTEM: ICD-10-CM

## 2023-01-04 DIAGNOSIS — Z87.442 PERSONAL HISTORY OF URINARY CALCULI: ICD-10-CM

## 2023-01-04 DIAGNOSIS — Z86.59 PERSONAL HISTORY OF OTHER MENTAL AND BEHAVIORAL DISORDERS: ICD-10-CM

## 2023-01-04 DIAGNOSIS — M62.9 DISORDER OF MUSCLE, UNSPECIFIED: ICD-10-CM

## 2023-01-04 DIAGNOSIS — R10.30 LOWER ABDOMINAL PAIN, UNSPECIFIED: ICD-10-CM

## 2023-01-04 DIAGNOSIS — U07.1 COVID-19: ICD-10-CM

## 2023-01-04 DIAGNOSIS — Z95.5 PRESENCE OF CORONARY ANGIOPLASTY IMPLANT AND GRAFT: ICD-10-CM

## 2023-01-04 DIAGNOSIS — M25.562 PAIN IN LEFT KNEE: ICD-10-CM

## 2023-01-04 DIAGNOSIS — Z87.898 PERSONAL HISTORY OF OTHER SPECIFIED CONDITIONS: ICD-10-CM

## 2023-01-04 DIAGNOSIS — L98.9 DISORDER OF THE SKIN AND SUBCUTANEOUS TISSUE, UNSPECIFIED: ICD-10-CM

## 2023-01-04 DIAGNOSIS — Z12.11 ENCOUNTER FOR SCREENING FOR MALIGNANT NEOPLASM OF COLON: ICD-10-CM

## 2023-01-04 DIAGNOSIS — Z87.39 PERSONAL HISTORY OF OTHER DISEASES OF THE MUSCULOSKELETAL SYSTEM AND CONNECTIVE TISSUE: ICD-10-CM

## 2023-01-04 DIAGNOSIS — K64.8 OTHER HEMORRHOIDS: ICD-10-CM

## 2023-01-04 DIAGNOSIS — Z76.89 PERSONS ENCOUNTERING HEALTH SERVICES IN OTHER SPECIFIED CIRCUMSTANCES: ICD-10-CM

## 2023-01-04 DIAGNOSIS — R94.39 ABNORMAL RESULT OF OTHER CARDIOVASCULAR FUNCTION STUDY: ICD-10-CM

## 2023-01-04 DIAGNOSIS — R10.32 LEFT LOWER QUADRANT PAIN: ICD-10-CM

## 2023-01-04 DIAGNOSIS — B00.52 HERPESVIRAL KERATITIS: ICD-10-CM

## 2023-01-04 DIAGNOSIS — G89.29 PAIN IN RIGHT FOOT: ICD-10-CM

## 2023-01-04 DIAGNOSIS — Z87.09 PERSONAL HISTORY OF OTHER DISEASES OF THE RESPIRATORY SYSTEM: ICD-10-CM

## 2023-01-04 PROCEDURE — 99215 OFFICE O/P EST HI 40 MIN: CPT

## 2023-01-04 RX ORDER — LORAZEPAM 0.5 MG/1
0.5 TABLET ORAL
Qty: 60 | Refills: 0 | Status: DISCONTINUED | COMMUNITY
Start: 2022-02-23 | End: 2023-01-04

## 2023-01-04 NOTE — PLAN
[FreeTextEntry1] : Pt will f/u for full physical. Will discuss necessary vaccinations and check labs. \par

## 2023-01-04 NOTE — HEALTH RISK ASSESSMENT
[Fair] : ~his/her~ current health as fair  [Good] : ~his/her~  mood as  good [Former] : Former [No] : In the past 12 months have you used drugs other than those required for medical reasons? No [No falls in past year] : Patient reported no falls in the past year [0] : 2) Feeling down, depressed, or hopeless: Not at all (0) [Patient reported colonoscopy was normal] : Patient reported colonoscopy was normal [HIV test declined] : HIV test declined [None] : None [With Significant Other] : lives with significant other [Employed] : employed [] :  [Feels Safe at Home] : Feels safe at home [Fully functional (bathing, dressing, toileting, transferring, walking, feeding)] : Fully functional (bathing, dressing, toileting, transferring, walking, feeding) [Fully functional (using the telephone, shopping, preparing meals, housekeeping, doing laundry, using] : Fully functional and needs no help or supervision to perform IADLs (using the telephone, shopping, preparing meals, housekeeping, doing laundry, using transportation, managing medications and managing finances) [Smoke Detector] : smoke detector [Carbon Monoxide Detector] : carbon monoxide detector [Seat Belt] :  uses seat belt [Travel to Developing Areas] : travel to developing areas [PHQ-2 Negative - No further assessment needed] : PHQ-2 Negative - No further assessment needed [No Retinopathy] : No retinopathy [de-identified] : 1 pack a day x  32 yrs [YearQuit] : 2008 [de-identified] : Active at work [de-identified] : Normal [[id number]] : 0 [EyeExamDate] : 10/22 [Change in mental status noted] : No change in mental status noted [Language] : denies difficulty with language [Behavior] : denies difficulty with behavior [Learning/Retaining New Information] : denies difficulty learning/retaining new information [Handling Complex Tasks] : denies difficulty handling complex tasks [Reasoning] : denies difficulty with reasoning [Spatial Ability and Orientation] : denies difficulty with spatial ability and orientation [Reports changes in hearing] : Reports no changes in hearing [Reports changes in vision] : Reports no changes in vision [Reports changes in dental health] : Reports no changes in dental health [Guns at Home] : no guns at home [TB Exposure] : is not being exposed to tuberculosis [ColonoscopyDate] : 11/21 [de-identified] : 10/222 [de-identified] : 12/22

## 2023-01-04 NOTE — HISTORY OF PRESENT ILLNESS
[FreeTextEntry1] : New patient- establish care.  [de-identified] : Mr. BE JAIMES is a 62 year old male here today to establish care. \par Covid x3\par Flu: Yes\par PCV: No\par TdaP: ?\par Shingles: No\par c/o recent ED- never happened before. \par

## 2023-01-18 ENCOUNTER — APPOINTMENT (OUTPATIENT)
Dept: THORACIC SURGERY | Facility: CLINIC | Age: 63
End: 2023-01-18
Payer: COMMERCIAL

## 2023-01-18 VITALS — BODY MASS INDEX: 29.02 KG/M2 | HEIGHT: 70.5 IN | WEIGHT: 205 LBS

## 2023-01-18 PROCEDURE — G0296 VISIT TO DETERM LDCT ELIG: CPT

## 2023-01-18 NOTE — PLAN
[Smoking Cessation] : smoking cessation [FreeTextEntry1] : Plan:\par \par -Low dose CT chest for lung cancer screening. MANJU JOHNSON ordered the low dose CT.      \par \par -Follow up with patient and his referring provider after his LDCT results have been reviewed by the multidisciplinary clinical team, if needed.\par \par -Encourage continued smoking abstinence.\par \par Should I screen? tool utilized. 6 Year risk of lung cancer is   1.6%. \par \par Patient wishes to proceed with screening.\par \par Engaged in discussion regarding risks of screening during Covid-19 pandemic and precautions that are being used  to reduce exposure.\par \par Engaged in shared decision making with Mr. JAIMES . Answered all questions. He verbalized understanding and agreement. He knows to call back with and questions or concerns.\par

## 2023-01-18 NOTE — HISTORY OF PRESENT ILLNESS
[Former] : Former [TextBox_13] : Responded to reminder letter\par Pulmonology Dr. Machuca\par PCP Dr. Costello\par \par BE JAIMES had telephonic visit for a review of eligibility and discussion of the Low dose CT lung cancer screening program. 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 He denies any signs or symptoms of lung cancer including new cough, changing cough, hemoptysis, and unintentional weight loss. \par \par Mr. JAIMES has history COPD and emphysema CAD stent placement 2022. He denies any personal history of lung cancer. Reports no lung cancer in a 1st degree relative. Reports no lung cancer in a 2nd degree relative. Denies any history of lung disease. Denies any history of occupational exposures. Has no exposure to 2nd hand smoke.\par  [YearQuit] : 2008 [PacksperYear] : 32

## 2023-01-18 NOTE — REASON FOR VISIT
Goal Outcome Evaluation:  Plan of Care Reviewed With: patient  Progress: no change  Outcome Summary: PT re-evaluation completed (initial eval for PT mobility). Pt demonstrates generalized weakness and decreased indep/safety re: functional mobility, warranting further skilled PT services to promote PLOF. Limited today by decreased cognitive status. Mech lift to recliner, max x2 for static sitting balance, but cooperative and able to follow commands for MMT. Recommend IP rehab at d/c (TBA further pending medical needs at d/c).   [Other Location: e.g. School (Enter Location, City,State)___] : at [unfilled], at the time of the educational consult. [Other Location: e.g. Home (Enter Location, City,State)___] : at [unfilled] [Participant] : the participant [Self] : self [Annual Follow-Up] : an annual follow-up visit

## 2023-01-18 NOTE — DATA REVIEWED
[Lung Cancer Screening] : Patient underwent lung cancer screening [2] : 2 [TextBox_12] : 04/17 [TextBox_27] : 11/18 [TextBox_42] : 01/22 [TextBox_52] : 4

## 2023-01-30 ENCOUNTER — APPOINTMENT (OUTPATIENT)
Dept: FAMILY MEDICINE | Facility: CLINIC | Age: 63
End: 2023-01-30
Payer: COMMERCIAL

## 2023-01-30 VITALS
SYSTOLIC BLOOD PRESSURE: 140 MMHG | WEIGHT: 205 LBS | HEART RATE: 73 BPM | OXYGEN SATURATION: 97 % | DIASTOLIC BLOOD PRESSURE: 80 MMHG | HEIGHT: 70 IN | BODY MASS INDEX: 29.35 KG/M2

## 2023-01-30 DIAGNOSIS — Z00.00 ENCOUNTER FOR GENERAL ADULT MEDICAL EXAMINATION W/OUT ABNORMAL FINDINGS: ICD-10-CM

## 2023-01-30 DIAGNOSIS — Z23 ENCOUNTER FOR IMMUNIZATION: ICD-10-CM

## 2023-01-30 PROCEDURE — 99396 PREV VISIT EST AGE 40-64: CPT | Mod: 25

## 2023-01-30 PROCEDURE — 90715 TDAP VACCINE 7 YRS/> IM: CPT

## 2023-01-30 PROCEDURE — 36415 COLL VENOUS BLD VENIPUNCTURE: CPT

## 2023-01-30 PROCEDURE — 90471 IMMUNIZATION ADMIN: CPT

## 2023-01-30 RX ORDER — NITROGLYCERIN 0.4 MG/1
0.4 TABLET SUBLINGUAL DAILY
Qty: 30 | Refills: 1 | Status: DISCONTINUED | COMMUNITY
Start: 2022-02-07 | End: 2023-01-30

## 2023-01-30 NOTE — REVIEW OF SYSTEMS
[Abdominal Pain] : abdominal pain [Frequency] : frequency [Negative] : Psychiatric [Vision Problems] : no vision problems [Recent Change In Weight] : ~T no recent weight change

## 2023-01-30 NOTE — HEALTH RISK ASSESSMENT
[Fair] : ~his/her~ current health as fair  [Good] : ~his/her~  mood as  good [Former] : Former [No] : In the past 12 months have you used drugs other than those required for medical reasons? No [No falls in past year] : Patient reported no falls in the past year [0] : 2) Feeling down, depressed, or hopeless: Not at all (0) [PHQ-2 Negative - No further assessment needed] : PHQ-2 Negative - No further assessment needed [No Retinopathy] : No retinopathy [Patient reported colonoscopy was normal] : Patient reported colonoscopy was normal [HIV test declined] : HIV test declined [None] : None [With Significant Other] : lives with significant other [Employed] : employed [] :  [Feels Safe at Home] : Feels safe at home [Fully functional (bathing, dressing, toileting, transferring, walking, feeding)] : Fully functional (bathing, dressing, toileting, transferring, walking, feeding) [Fully functional (using the telephone, shopping, preparing meals, housekeeping, doing laundry, using] : Fully functional and needs no help or supervision to perform IADLs (using the telephone, shopping, preparing meals, housekeeping, doing laundry, using transportation, managing medications and managing finances) [Smoke Detector] : smoke detector [Carbon Monoxide Detector] : carbon monoxide detector [Seat Belt] :  uses seat belt [Travel to Developing Areas] : travel to developing areas [15-19] : 15-19 [de-identified] : 1 pack a day x  32 yrs [de-identified] : Active at work [de-identified] : Normal [CCH6Hucps] : 0 [EyeExamDate] : 10/22 [Change in mental status noted] : No change in mental status noted [Language] : denies difficulty with language [Behavior] : denies difficulty with behavior [Handling Complex Tasks] : denies difficulty handling complex tasks [Learning/Retaining New Information] : denies difficulty learning/retaining new information [Reasoning] : denies difficulty with reasoning [Spatial Ability and Orientation] : denies difficulty with spatial ability and orientation [Reports changes in hearing] : Reports no changes in hearing [Reports changes in vision] : Reports no changes in vision [Reports changes in dental health] : Reports no changes in dental health [Guns at Home] : no guns at home [TB Exposure] : is not being exposed to tuberculosis [ColonoscopyDate] : 11/21 [de-identified] : 10/222 [de-identified] : 12/22

## 2023-01-30 NOTE — HISTORY OF PRESENT ILLNESS
[FreeTextEntry1] : Annual [de-identified] : Mr. BE JAIMES is a 62 year old male here today for his annual\par Having a mild flare of his diverticulitis. \par

## 2023-01-31 ENCOUNTER — RESULT REVIEW (OUTPATIENT)
Age: 63
End: 2023-01-31

## 2023-01-31 LAB
BASOPHILS # BLD AUTO: 0.05 K/UL
BASOPHILS NFR BLD AUTO: 0.5 %
EOSINOPHIL # BLD AUTO: 0.25 K/UL
EOSINOPHIL NFR BLD AUTO: 2.3 %
ESTIMATED AVERAGE GLUCOSE: 131 MG/DL
HBA1C MFR BLD HPLC: 6.2 %
HCT VFR BLD CALC: 47.5 %
HGB BLD-MCNC: 15.2 G/DL
IMM GRANULOCYTES NFR BLD AUTO: 0.3 %
LYMPHOCYTES # BLD AUTO: 2.91 K/UL
LYMPHOCYTES NFR BLD AUTO: 27.1 %
MAN DIFF?: NORMAL
MCHC RBC-ENTMCNC: 29.2 PG
MCHC RBC-ENTMCNC: 32 GM/DL
MCV RBC AUTO: 91.3 FL
MONOCYTES # BLD AUTO: 0.61 K/UL
MONOCYTES NFR BLD AUTO: 5.7 %
NEUTROPHILS # BLD AUTO: 6.88 K/UL
NEUTROPHILS NFR BLD AUTO: 64.1 %
PLATELET # BLD AUTO: 336 K/UL
PSA SERPL-MCNC: 0.72 NG/ML
RBC # BLD: 5.2 M/UL
RBC # FLD: 12.9 %
WBC # FLD AUTO: 10.73 K/UL

## 2023-02-02 ENCOUNTER — NON-APPOINTMENT (OUTPATIENT)
Age: 63
End: 2023-02-02

## 2023-02-08 ENCOUNTER — APPOINTMENT (OUTPATIENT)
Dept: CARDIOLOGY | Facility: CLINIC | Age: 63
End: 2023-02-08

## 2023-02-15 ENCOUNTER — APPOINTMENT (OUTPATIENT)
Dept: FAMILY MEDICINE | Facility: CLINIC | Age: 63
End: 2023-02-15
Payer: COMMERCIAL

## 2023-02-15 DIAGNOSIS — F41.0 PANIC DISORDER [EPISODIC PAROXYSMAL ANXIETY]: ICD-10-CM

## 2023-02-15 PROCEDURE — 99213 OFFICE O/P EST LOW 20 MIN: CPT

## 2023-02-15 RX ORDER — LORAZEPAM 0.5 MG/1
0.5 TABLET ORAL
Qty: 15 | Refills: 0 | Status: ACTIVE | COMMUNITY
Start: 1900-01-01 | End: 1900-01-01

## 2023-02-15 NOTE — HISTORY OF PRESENT ILLNESS
[FreeTextEntry1] : Here to discuss use of xanax. [de-identified] : Mr. BE JAIMES is a 62 year old male here today for refill of xanax. He was having panic attacks about a year ago. Dr Machuca prescribed it to use when he started to feel anxious. Has used infrequently since 60 tablets were sent in last February. \par \par

## 2023-02-15 NOTE — HEALTH RISK ASSESSMENT
[No] : In the past 12 months have you used drugs other than those required for medical reasons? No [No falls in past year] : Patient reported no falls in the past year [0] : 2) Feeling down, depressed, or hopeless: Not at all (0) [PHQ-2 Negative - No further assessment needed] : PHQ-2 Negative - No further assessment needed [Former] : Former [de-identified] : Active at work [de-identified] : Normal [DYR1Zyceb] : 0 [de-identified] : 2010

## 2023-04-19 RX ORDER — ROSUVASTATIN CALCIUM 40 MG/1
40 TABLET, FILM COATED ORAL
Qty: 90 | Refills: 3 | Status: ACTIVE | COMMUNITY
Start: 2022-06-08 | End: 1900-01-01

## 2023-05-24 ENCOUNTER — APPOINTMENT (OUTPATIENT)
Dept: CARDIOLOGY | Facility: CLINIC | Age: 63
End: 2023-05-24
Payer: COMMERCIAL

## 2023-05-24 ENCOUNTER — NON-APPOINTMENT (OUTPATIENT)
Age: 63
End: 2023-05-24

## 2023-05-24 VITALS
TEMPERATURE: 98 F | DIASTOLIC BLOOD PRESSURE: 80 MMHG | OXYGEN SATURATION: 97 % | HEART RATE: 75 BPM | RESPIRATION RATE: 16 BRPM | WEIGHT: 194 LBS | BODY MASS INDEX: 27.84 KG/M2 | SYSTOLIC BLOOD PRESSURE: 137 MMHG

## 2023-05-24 PROCEDURE — 99214 OFFICE O/P EST MOD 30 MIN: CPT

## 2023-05-24 PROCEDURE — 93000 ELECTROCARDIOGRAM COMPLETE: CPT

## 2023-05-24 PROCEDURE — 36415 COLL VENOUS BLD VENIPUNCTURE: CPT

## 2023-05-24 NOTE — WORK
[Date: ______] : This certifies that [unfilled] was seen at [facility] on [unfilled]  [Medical Exam] : for a medical examination

## 2023-05-25 LAB
ALBUMIN SERPL ELPH-MCNC: 4.7 G/DL
ALP BLD-CCNC: 53 U/L
ALT SERPL-CCNC: 19 U/L
ANION GAP SERPL CALC-SCNC: 14 MMOL/L
AST SERPL-CCNC: 16 U/L
BILIRUB SERPL-MCNC: 0.9 MG/DL
BUN SERPL-MCNC: 11 MG/DL
CALCIUM SERPL-MCNC: 9.5 MG/DL
CHLORIDE SERPL-SCNC: 101 MMOL/L
CHOLEST SERPL-MCNC: 124 MG/DL
CO2 SERPL-SCNC: 24 MMOL/L
CREAT SERPL-MCNC: 0.7 MG/DL
EGFR: 104 ML/MIN/1.73M2
GLUCOSE SERPL-MCNC: 115 MG/DL
HDLC SERPL-MCNC: 49 MG/DL
LDLC SERPL CALC-MCNC: 59 MG/DL
NONHDLC SERPL-MCNC: 75 MG/DL
POTASSIUM SERPL-SCNC: 4.5 MMOL/L
PROT SERPL-MCNC: 7.2 G/DL
SODIUM SERPL-SCNC: 139 MMOL/L
TRIGL SERPL-MCNC: 82 MG/DL

## 2023-06-03 ENCOUNTER — RX RENEWAL (OUTPATIENT)
Age: 63
End: 2023-06-03

## 2023-06-03 RX ORDER — SILDENAFIL 50 MG/1
50 TABLET ORAL
Qty: 5 | Refills: 5 | Status: ACTIVE | COMMUNITY
Start: 2023-01-04 | End: 1900-01-01

## 2023-07-11 ENCOUNTER — RX RENEWAL (OUTPATIENT)
Age: 63
End: 2023-07-11

## 2023-07-11 RX ORDER — AMLODIPINE BESYLATE 5 MG/1
5 TABLET ORAL DAILY
Qty: 90 | Refills: 3 | Status: ACTIVE | COMMUNITY
Start: 2022-11-16 | End: 1900-01-01

## 2023-11-01 ENCOUNTER — NON-APPOINTMENT (OUTPATIENT)
Age: 63
End: 2023-11-01

## 2023-11-01 ENCOUNTER — APPOINTMENT (OUTPATIENT)
Dept: CARDIOLOGY | Facility: CLINIC | Age: 63
End: 2023-11-01
Payer: COMMERCIAL

## 2023-11-01 VITALS
WEIGHT: 200 LBS | BODY MASS INDEX: 28.63 KG/M2 | HEIGHT: 70 IN | DIASTOLIC BLOOD PRESSURE: 94 MMHG | HEART RATE: 69 BPM | SYSTOLIC BLOOD PRESSURE: 149 MMHG | OXYGEN SATURATION: 97 %

## 2023-11-01 DIAGNOSIS — Z86.39 PERSONAL HISTORY OF OTHER ENDOCRINE, NUTRITIONAL AND METABOLIC DISEASE: ICD-10-CM

## 2023-11-01 DIAGNOSIS — Z86.79 PERSONAL HISTORY OF OTHER DISEASES OF THE CIRCULATORY SYSTEM: ICD-10-CM

## 2023-11-01 PROCEDURE — 99214 OFFICE O/P EST MOD 30 MIN: CPT | Mod: 25

## 2023-11-01 PROCEDURE — 93000 ELECTROCARDIOGRAM COMPLETE: CPT

## 2023-11-02 PROBLEM — Z86.79 HISTORY OF HYPERTENSION: Status: RESOLVED | Noted: 2023-11-02 | Resolved: 2023-11-02

## 2023-11-02 PROBLEM — Z86.79 HISTORY OF CORONARY ATHEROSCLEROSIS: Status: RESOLVED | Noted: 2023-11-02 | Resolved: 2023-11-02

## 2023-11-02 PROBLEM — Z86.39 HISTORY OF HYPERLIPIDEMIA: Status: RESOLVED | Noted: 2023-11-02 | Resolved: 2023-11-02

## 2023-11-06 ENCOUNTER — RESULT REVIEW (OUTPATIENT)
Age: 63
End: 2023-11-06

## 2023-11-08 ENCOUNTER — NON-APPOINTMENT (OUTPATIENT)
Age: 63
End: 2023-11-08

## 2023-11-24 RX ORDER — PANTOPRAZOLE 40 MG/1
40 TABLET, DELAYED RELEASE ORAL DAILY
Qty: 90 | Refills: 3 | Status: ACTIVE | COMMUNITY
Start: 2023-11-24 | End: 1900-01-01

## 2023-12-05 ENCOUNTER — APPOINTMENT (OUTPATIENT)
Dept: PULMONOLOGY | Facility: CLINIC | Age: 63
End: 2023-12-05

## 2023-12-27 ENCOUNTER — RX RENEWAL (OUTPATIENT)
Age: 63
End: 2023-12-27

## 2024-01-02 ENCOUNTER — NON-APPOINTMENT (OUTPATIENT)
Age: 64
End: 2024-01-02

## 2024-01-02 ENCOUNTER — APPOINTMENT (OUTPATIENT)
Dept: PULMONOLOGY | Facility: CLINIC | Age: 64
End: 2024-01-02
Payer: COMMERCIAL

## 2024-01-02 VITALS
HEART RATE: 79 BPM | WEIGHT: 200 LBS | BODY MASS INDEX: 28.63 KG/M2 | SYSTOLIC BLOOD PRESSURE: 120 MMHG | DIASTOLIC BLOOD PRESSURE: 80 MMHG | HEIGHT: 70 IN | OXYGEN SATURATION: 95 %

## 2024-01-02 PROCEDURE — 99214 OFFICE O/P EST MOD 30 MIN: CPT | Mod: 25

## 2024-01-02 PROCEDURE — 94010 BREATHING CAPACITY TEST: CPT

## 2024-01-02 NOTE — ASSESSMENT
[FreeTextEntry1] : Baseline spirometry is essentially normal.  I have reassured the patient that his lung function is not deteriorating.  He is advised to try Anoro on a regular basis and call me in a month.  He is due for a low-dose CAT scan of the chest in February of this year.

## 2024-01-02 NOTE — HISTORY OF PRESENT ILLNESS
[TextBox_4] : Patient is a former smoker with history of mild COPD on PFTs done a year ago.  At that time his diffusion capacity was 49% of predicted.  Over the past 2 months he has noted occasional slight increasing shortness of breath at times.  He continues to work 2 jobs but occasionally gets short of breath.  No cough or wheezing.  1 week ago he caught the flu and developed a cough with light yellow sputum.  He is feeling better now.  He went to the emergency room 1 week ago and was told of flu.  His chest x-ray was clear.  He is not on bronchodilators.  He would like to know if his COPD is getting worse.  His baseline spirometry today is within normal limits current O2 sat 97 on room air.

## 2024-01-04 RX ORDER — LISINOPRIL 40 MG/1
40 TABLET ORAL
Qty: 90 | Refills: 3 | Status: ACTIVE | COMMUNITY
Start: 2022-03-29 | End: 1900-01-01

## 2024-01-29 ENCOUNTER — APPOINTMENT (OUTPATIENT)
Dept: THORACIC SURGERY | Facility: CLINIC | Age: 64
End: 2024-01-29
Payer: COMMERCIAL

## 2024-01-29 VITALS — BODY MASS INDEX: 28 KG/M2 | WEIGHT: 200 LBS | HEIGHT: 71 IN

## 2024-01-29 DIAGNOSIS — Z87.891 PERSONAL HISTORY OF NICOTINE DEPENDENCE: ICD-10-CM

## 2024-01-29 PROCEDURE — G0296 VISIT TO DETERM LDCT ELIG: CPT

## 2024-01-29 NOTE — DATA REVIEWED
[Lung Cancer Screening] : Patient underwent lung cancer screening [2] : 2 [TextBox_12] : 08/17 [TextBox_27] : 11/18 [TextBox_42] : 01/23 [TextBox_52] : 5

## 2024-01-29 NOTE — PLAN
[Smoking Cessation] : smoking cessation [FreeTextEntry1] : Plan:  -Low dose CT chest for lung cancer screening. Louie Kaleb ordered the low dose CT.       Pt has appointment with PCP 1/31/2024 and will obtain RX at that time.  -Follow up with his referring provider after his LDCT results have been reviewed by the multidisciplinary clinical team, if needed.   -Encourage continued smoking abstinence.   Should I screen? tool utilized. 6 Year risk of lung cancer is   1.6%.   Patient wishes to proceed with screening.  Engaged in discussion regarding risks of screening during Covid-19 pandemic and precautions that are being used  to reduce exposure.  Engaged in shared decision making with Fabiana FIONA . Answered all questions. He verbalized understanding and agreement. He knows to call back with and questions or concerns.

## 2024-01-31 ENCOUNTER — APPOINTMENT (OUTPATIENT)
Dept: FAMILY MEDICINE | Facility: CLINIC | Age: 64
End: 2024-01-31
Payer: COMMERCIAL

## 2024-01-31 VITALS
WEIGHT: 204 LBS | HEART RATE: 77 BPM | SYSTOLIC BLOOD PRESSURE: 118 MMHG | DIASTOLIC BLOOD PRESSURE: 78 MMHG | OXYGEN SATURATION: 96 % | HEIGHT: 71 IN | BODY MASS INDEX: 28.56 KG/M2

## 2024-01-31 DIAGNOSIS — R73.03 PREDIABETES.: ICD-10-CM

## 2024-01-31 DIAGNOSIS — Z12.83 ENCOUNTER FOR SCREENING FOR MALIGNANT NEOPLASM OF SKIN: ICD-10-CM

## 2024-01-31 DIAGNOSIS — R91.1 SOLITARY PULMONARY NODULE: ICD-10-CM

## 2024-01-31 DIAGNOSIS — Z00.00 ENCOUNTER FOR GENERAL ADULT MEDICAL EXAMINATION W/OUT ABNORMAL FINDINGS: ICD-10-CM

## 2024-01-31 DIAGNOSIS — Z12.2 ENCOUNTER FOR SCREENING FOR MALIGNANT NEOPLASM OF RESPIRATORY ORGANS: ICD-10-CM

## 2024-01-31 PROCEDURE — 99396 PREV VISIT EST AGE 40-64: CPT

## 2024-01-31 PROCEDURE — 99401 PREV MED CNSL INDIV APPRX 15: CPT

## 2024-01-31 PROCEDURE — 36415 COLL VENOUS BLD VENIPUNCTURE: CPT

## 2024-01-31 RX ORDER — UMECLIDINIUM BROMIDE AND VILANTEROL TRIFENATATE 62.5; 25 UG/1; UG/1
62.5-25 POWDER RESPIRATORY (INHALATION)
Qty: 1 | Refills: 5 | Status: DISCONTINUED | COMMUNITY
Start: 2022-03-28 | End: 2024-01-31

## 2024-01-31 RX ORDER — PANTOPRAZOLE 40 MG/1
40 TABLET, DELAYED RELEASE ORAL DAILY
Qty: 90 | Refills: 3 | Status: DISCONTINUED | COMMUNITY
Start: 2023-10-12 | End: 2024-01-31

## 2024-01-31 NOTE — HISTORY OF PRESENT ILLNESS
[FreeTextEntry1] : CPE [de-identified] : 63 year old M  presents for CPE Pt is feeling well today - no acute complaints Lives in the [place] . Feels safe at home. 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 Denies fever, night sweats, adenopathy, unexplained weight loss Agreeable for lab work;

## 2024-01-31 NOTE — PHYSICAL EXAM
[No Acute Distress] : no acute distress [Well-Appearing] : well-appearing [Normal Voice/Communication] : normal voice/communication [Normal Sclera/Conjunctiva] : normal sclera/conjunctiva [PERRL] : pupils equal round and reactive to light [EOMI] : extraocular movements intact [Normal Outer Ear/Nose] : the outer ears and nose were normal in appearance [Normal Oropharynx] : the oropharynx was normal [Normal TMs] : both tympanic membranes were normal [No Lymphadenopathy] : no lymphadenopathy [Supple] : supple [No Respiratory Distress] : no respiratory distress  [No Accessory Muscle Use] : no accessory muscle use [Clear to Auscultation] : lungs were clear to auscultation bilaterally [Normal Rate] : normal rate  [Regular Rhythm] : with a regular rhythm [Normal S1, S2] : normal S1 and S2 [No Edema] : there was no peripheral edema [Soft] : abdomen soft [Non Tender] : non-tender [Non-distended] : non-distended [Normal Bowel Sounds] : normal bowel sounds [Coordination Grossly Intact] : coordination grossly intact [No Focal Deficits] : no focal deficits [Normal] : affect was normal and insight and judgment were intact

## 2024-01-31 NOTE — REVIEW OF SYSTEMS
[Fever] : no fever [Chills] : no chills [Fatigue] : no fatigue [Recent Change In Weight] : ~T no recent weight change [Earache] : no earache [Chest Pain] : no chest pain [Palpitations] : no palpitations [Shortness Of Breath] : no shortness of breath [Wheezing] : no wheezing [Cough] : no cough [Abdominal Pain] : no abdominal pain [Nausea] : no nausea [Constipation] : no constipation [Vomiting] : no vomiting [Headache] : no headache

## 2024-01-31 NOTE — HEALTH RISK ASSESSMENT
[Fair] : ~his/her~ current health as fair  [Good] : ~his/her~  mood as  good [No] : In the past 12 months have you used drugs other than those required for medical reasons? No [No falls in past year] : Patient reported no falls in the past year [0] : 2) Feeling down, depressed, or hopeless: Not at all (0) [PHQ-2 Negative - No further assessment needed] : PHQ-2 Negative - No further assessment needed [No Retinopathy] : No retinopathy [Patient reported colonoscopy was normal] : Patient reported colonoscopy was normal [HIV test declined] : HIV test declined [None] : None [With Significant Other] : lives with significant other [Employed] : employed [] :  [Feels Safe at Home] : Feels safe at home [Fully functional (bathing, dressing, toileting, transferring, walking, feeding)] : Fully functional (bathing, dressing, toileting, transferring, walking, feeding) [Fully functional (using the telephone, shopping, preparing meals, housekeeping, doing laundry, using] : Fully functional and needs no help or supervision to perform IADLs (using the telephone, shopping, preparing meals, housekeeping, doing laundry, using transportation, managing medications and managing finances) [Smoke Detector] : smoke detector [Carbon Monoxide Detector] : carbon monoxide detector [Seat Belt] :  uses seat belt [Travel to Developing Areas] : travel to developing areas [Former] : Former [Hepatitis C test declined] : Hepatitis C test declined [> 15 Years] : > 15 Years [1 or 2 (0 pts)] : 1 or 2 (0 points) [Never (0 pts)] : Never (0 points) [Audit-CScore] : 0 [de-identified] : Active at work [de-identified] : Normal [JQS3Fmoam] : 0 [EyeExamDate] : 10/22 [Change in mental status noted] : No change in mental status noted [Language] : denies difficulty with language [Behavior] : denies difficulty with behavior [Learning/Retaining New Information] : denies difficulty learning/retaining new information [Handling Complex Tasks] : denies difficulty handling complex tasks [Reasoning] : denies difficulty with reasoning [Spatial Ability and Orientation] : denies difficulty with spatial ability and orientation [Reports changes in hearing] : Reports no changes in hearing [Reports changes in vision] : Reports no changes in vision [Reports changes in dental health] : Reports no changes in dental health [Guns at Home] : no guns at home [TB Exposure] : is not being exposed to tuberculosis [ColonoscopyDate] : 11/11/2021 [ColonoscopyComments] : f/u in 5 years  [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

## 2024-02-01 LAB
ALBUMIN SERPL ELPH-MCNC: 4.8 G/DL
ALP BLD-CCNC: 66 U/L
ALT SERPL-CCNC: 22 U/L
ANION GAP SERPL CALC-SCNC: 13 MMOL/L
AST SERPL-CCNC: 25 U/L
BILIRUB SERPL-MCNC: 1.1 MG/DL
BUN SERPL-MCNC: 13 MG/DL
CALCIUM SERPL-MCNC: 9.4 MG/DL
CHLORIDE SERPL-SCNC: 102 MMOL/L
CHOLEST SERPL-MCNC: 139 MG/DL
CO2 SERPL-SCNC: 24 MMOL/L
CREAT SERPL-MCNC: 0.7 MG/DL
EGFR: 103 ML/MIN/1.73M2
ESTIMATED AVERAGE GLUCOSE: 128 MG/DL
GLUCOSE SERPL-MCNC: 114 MG/DL
HBA1C MFR BLD HPLC: 6.1 %
HDLC SERPL-MCNC: 50 MG/DL
LDLC SERPL CALC-MCNC: 67 MG/DL
NONHDLC SERPL-MCNC: 88 MG/DL
POTASSIUM SERPL-SCNC: 4.6 MMOL/L
PROT SERPL-MCNC: 7.3 G/DL
SODIUM SERPL-SCNC: 139 MMOL/L
TRIGL SERPL-MCNC: 119 MG/DL

## 2024-02-07 ENCOUNTER — APPOINTMENT (OUTPATIENT)
Dept: PULMONOLOGY | Facility: CLINIC | Age: 64
End: 2024-02-07
Payer: COMMERCIAL

## 2024-02-07 VITALS
OXYGEN SATURATION: 98 % | BODY MASS INDEX: 28.56 KG/M2 | SYSTOLIC BLOOD PRESSURE: 110 MMHG | DIASTOLIC BLOOD PRESSURE: 70 MMHG | WEIGHT: 204 LBS | HEART RATE: 75 BPM | HEIGHT: 71 IN

## 2024-02-07 DIAGNOSIS — J32.9 CHRONIC SINUSITIS, UNSPECIFIED: ICD-10-CM

## 2024-02-07 PROCEDURE — 99213 OFFICE O/P EST LOW 20 MIN: CPT

## 2024-02-07 NOTE — ASSESSMENT
[FreeTextEntry1] : Patient with a likely sinus infection.  Will start Augmentin 875 twice daily for 10 days.  Patient is to call in 2 weeks for follow-up.

## 2024-02-07 NOTE — HISTORY OF PRESENT ILLNESS
[TextBox_4] : Patient with mild COPD started on Anoro 1 month ago.  He did not really feel any better on Anoro.  The past 2 weeks he has developed facial pain with the postnasal drip and yellow nasal discharge.  He has a prior history of sinus infections.  3 days ago he felt very achy with chills.  He stopped Anoro because he thought this was causing an sinus infection.  He took 3 pills of Augmentin which she had at home and now feels better.  He denies chronic shortness of breath with activity.  His last PFTs revealed mild obstruction with a diffusion capacity of 49%.  He is a former smoker and is scheduled for CT lung cancer screening next week.

## 2024-02-10 ENCOUNTER — RESULT REVIEW (OUTPATIENT)
Age: 64
End: 2024-02-10

## 2024-02-23 ENCOUNTER — NON-APPOINTMENT (OUTPATIENT)
Age: 64
End: 2024-02-23

## 2024-03-06 ENCOUNTER — APPOINTMENT (OUTPATIENT)
Dept: PULMONOLOGY | Facility: CLINIC | Age: 64
End: 2024-03-06
Payer: COMMERCIAL

## 2024-03-06 ENCOUNTER — NON-APPOINTMENT (OUTPATIENT)
Age: 64
End: 2024-03-06

## 2024-03-06 VITALS
SYSTOLIC BLOOD PRESSURE: 140 MMHG | WEIGHT: 204 LBS | HEIGHT: 71 IN | OXYGEN SATURATION: 98 % | HEART RATE: 78 BPM | DIASTOLIC BLOOD PRESSURE: 90 MMHG | BODY MASS INDEX: 28.56 KG/M2

## 2024-03-06 DIAGNOSIS — J44.9 CHRONIC OBSTRUCTIVE PULMONARY DISEASE, UNSPECIFIED: ICD-10-CM

## 2024-03-06 PROCEDURE — 99214 OFFICE O/P EST MOD 30 MIN: CPT

## 2024-03-06 NOTE — ASSESSMENT
[FreeTextEntry1] : Patient clearly had a patchy right lower lobe pneumonia over a month ago that was treated with antibiotics.  He has clinically responded and is back to baseline.  His last spirometry 2 months ago was essentially normal.  He is known to have a moderately reduced diffusion capacity due to prior smoking history.  Patient will be observed over the following several months and we will repeat his scan in 6 months.  He will call me if there are any issues.

## 2024-03-06 NOTE — HISTORY OF PRESENT ILLNESS
[TextBox_4] : Patient is doing much better than when I saw him a month ago.  He was treated with antibiotics and feels much better.  He now has an occasional cough in the morning.  He is able to go to work and has no shortness of breath with activity.  CAT scan was done on February 10 which reveals a new right lower lobe patchy infiltrate.  He feels he is back to baseline.  He is currently off Anoro as he was concerned that it may have caused a postnasal drip.  He is recommended to have a repeat CAT scan of his chest in 6 months for follow-up.

## 2024-04-04 ENCOUNTER — APPOINTMENT (OUTPATIENT)
Dept: OPHTHALMOLOGY | Facility: CLINIC | Age: 64
End: 2024-04-04
Payer: COMMERCIAL

## 2024-04-04 ENCOUNTER — NON-APPOINTMENT (OUTPATIENT)
Age: 64
End: 2024-04-04

## 2024-04-04 PROCEDURE — 92012 INTRM OPH EXAM EST PATIENT: CPT

## 2024-04-17 ENCOUNTER — RX RENEWAL (OUTPATIENT)
Age: 64
End: 2024-04-17

## 2024-05-03 ENCOUNTER — APPOINTMENT (OUTPATIENT)
Dept: PULMONOLOGY | Facility: CLINIC | Age: 64
End: 2024-05-03

## 2024-06-10 ENCOUNTER — APPOINTMENT (OUTPATIENT)
Dept: PODIATRY | Facility: CLINIC | Age: 64
End: 2024-06-10
Payer: COMMERCIAL

## 2024-06-10 ENCOUNTER — RESULT REVIEW (OUTPATIENT)
Age: 64
End: 2024-06-10

## 2024-06-10 DIAGNOSIS — M84.374A STRESS FRACTURE, RIGHT FOOT, INITIAL ENCOUNTER FOR FRACTURE: ICD-10-CM

## 2024-06-10 DIAGNOSIS — M72.2 PLANTAR FASCIAL FIBROMATOSIS: ICD-10-CM

## 2024-06-10 PROCEDURE — 99204 OFFICE O/P NEW MOD 45 MIN: CPT

## 2024-06-10 PROCEDURE — 73630 X-RAY EXAM OF FOOT: CPT | Mod: RT

## 2024-06-10 RX ORDER — METHYLPREDNISOLONE 4 MG/1
4 TABLET ORAL
Qty: 1 | Refills: 1 | Status: ACTIVE | COMMUNITY
Start: 2024-06-10 | End: 1900-01-01

## 2024-06-10 NOTE — REASON FOR VISIT
[Initial Visit] : an initial visit for [Achilles Tendinitis] : Achilles Tendinitis [Foot Pain] : foot pain [Plantar Fasciitis] : plantar fasciitis

## 2024-06-10 NOTE — PHYSICAL EXAM
[General Appearance - Alert] : alert [General Appearance - In No Acute Distress] : in no acute distress [Skin Color & Pigmentation] : normal skin color and pigmentation [Skin Turgor] : normal skin turgor [] : no rash [Skin Lesions] : no skin lesions [FreeTextEntry3] : Vascular exam reveals palpable pedal pulses, the foot is warm to touch, there was good capillary fill time, the skin is normal in appearance there is no evidence of vascular disease or compromise at this time [de-identified] : no bruising, no ecchymosis, no breaks in the skin, no evidence of plantar fibromas noted., no history of injury or trauma, reproducible pain upon palpation of the plantar aspect of the calcaneus without medial lateral squeeze pain, pain along the medial band of the plantar fascia and insertion of the plantar fascia to calcaneus, patient able to walk on the heels but does admit to pain, admits to post-static dyskinesia., admits to pain at the end of the day., no pain to the posterior aspect of the calcaneus, no evidence of Denton's deformity, no void felt in the Achilles tendon, patient able to walk on there toes without pain, patient able to walk on there toes but with pain, denies numbness tingling and sharp shooting pains, no evidence of a Tinel's sign upon percussion of the posterior tibial nerve   [Foot Ulcer] : no foot ulcer [Skin Induration] : no skin induration [Sensation] : the sensory exam was normal to light touch and pinprick [No Focal Deficits] : no focal deficits [Deep Tendon Reflexes (DTR)] : deep tendon reflexes were 2+ and symmetric [Motor Exam] : the motor exam was normal [Oriented To Time, Place, And Person] : oriented to person, place, and time [Impaired Insight] : insight and judgment were intact [Affect] : the affect was normal

## 2024-06-10 NOTE — HISTORY OF PRESENT ILLNESS
[FreeTextEntry1] : Location: posterior/plantar aspect of the right foot Duration: about 2 months Etiology: unknown/ no hx of injury or trauma Past Tx: none Exacerbated by: weight  bearing, PSD Prior Hx: yes

## 2024-06-10 NOTE — PROCEDURE
[FreeTextEntry1] : X-rays were taken in the office multiple views right foot simulating angle and base of gait The x-rays do show a plantar calcaneal spur but in the vicinity of the plantar calcaneal spur there could be what I interpret as a stress line running inferior to superior approximately 1/3-1/2 way the body of the calcaneus. At this time it is crucial to rule out stress fracture before initiating any other treatment. Based on my physical examination and my clinical findings and the patient's description of the symptoms, a complete differential diagnosis was reviewed with the patient. Possible diagnoses as well as treatment options explained in great detail. All questions asked and answered appropriately. However given the extent of the pain and the severe antalgic gait I will prescribe a Medrol Dosepak short-term After evaluating the patient and examining the area in question and reviewing the x-rays I feel at this time a magnetic resonance imaging is indicated and as a medical necessity and this note will serve as a letter medical necessity. Given the severity of the injury and the mechanism of injury I am concerned about osseous as well as significant soft tissue pathology, injury tear and possible rupture. I feel that an magnetic resonance imaging is the most appropriate diagnostic tests at this time and the patient should be granted authorization for an magnetic resonance imaging i have dispensed a pair of heel cushions to the patient and advised the patient that accommodative support as well as elevation of both heels to help reduce symptoms greater than 45 minutes spent with patient Follow up after MRI

## 2024-06-17 ENCOUNTER — APPOINTMENT (OUTPATIENT)
Dept: CARDIOLOGY | Facility: CLINIC | Age: 64
End: 2024-06-17
Payer: COMMERCIAL

## 2024-06-17 VITALS
BODY MASS INDEX: 28.98 KG/M2 | SYSTOLIC BLOOD PRESSURE: 135 MMHG | DIASTOLIC BLOOD PRESSURE: 88 MMHG | WEIGHT: 207 LBS | HEART RATE: 88 BPM | OXYGEN SATURATION: 98 % | RESPIRATION RATE: 14 BRPM | HEIGHT: 71 IN

## 2024-06-17 DIAGNOSIS — I10 ESSENTIAL (PRIMARY) HYPERTENSION: ICD-10-CM

## 2024-06-17 DIAGNOSIS — E78.5 HYPERLIPIDEMIA, UNSPECIFIED: ICD-10-CM

## 2024-06-17 DIAGNOSIS — I25.10 ATHEROSCLEROTIC HEART DISEASE OF NATIVE CORONARY ARTERY W/OUT ANGINA PECTORIS: ICD-10-CM

## 2024-06-17 DIAGNOSIS — Z87.898 PERSONAL HISTORY OF OTHER SPECIFIED CONDITIONS: ICD-10-CM

## 2024-06-17 PROCEDURE — 99214 OFFICE O/P EST MOD 30 MIN: CPT | Mod: 25

## 2024-06-17 PROCEDURE — 93000 ELECTROCARDIOGRAM COMPLETE: CPT

## 2024-06-17 NOTE — HISTORY OF PRESENT ILLNESS
[FreeTextEntry1] : 63 yo male former smoker, with COPD, hypertension, and recently diagnosed CAD -> PCI of LPDA with TANISHA on 2/18/22 at [facility]. He presents today for follow-up. Doing well. Patient denies chest pain, dyspnea, palpitations, syncope, edema, melena, hematochezia, or hematemesis.\par \par PCP: Ruben Machuca

## 2024-06-17 NOTE — WORK
[Date: ______] : This certifies that [unfilled] was seen at [facility] on [unfilled]  [Medical Exam] : for a medical examination

## 2024-06-17 NOTE — CARDIOLOGY SUMMARY
[de-identified] : \par 5/24/23 ECG: Sinus rhythm, rate 77 bpm\par 11/16/22 ECG: Sinus rhythm, rate 65 bpm\par  [de-identified] : \par 2/11/22 Lexiscan Myoview (at [place]): Unable to reach target HR with treadmill and converted to Lexiscan nuclear stress test. No CP but dyspnea. No ECG changes. Small area of mild to moderate ischemia in mid to basal inferior region. Small area of mild basal anterolateral ischemia. Normal LV size and wall motion, LVEF 71%.\par  [de-identified] : \par 2/17/22 Echo: Normal LV size and systolic function, LVEF 61%. Upper normal LA volume index (34 ml/m2). Mild MV thickening. Trace MR. Trace TR. Normal PASP. Trace MD. Mildly dilated aortic root (3.8 cm).\par  [de-identified] : \par 2/18/22 Cardiac cath (at [facility]): \par 90% LPDA -> PCI with TANISHA (Skypoint stent)\par 60% distal RCA (co-dominant)\par LVEF 60%\par LVEDP 14 mmHg

## 2024-06-17 NOTE — HISTORY OF PRESENT ILLNESS
[FreeTextEntry1] : 64 yo male former smoker, with COPD, hypertension, and CAD -> PCI of LPDA with TANISHA on 2/18/22 at [facility]. He presents today for follow-up. Doing well. Patient denies chest pain, dyspnea, palpitations, syncope, edema, melena, hematochezia, or hematemesis.  PCP: Ruben Machuca

## 2024-06-17 NOTE — ASSESSMENT
[FreeTextEntry1] : 64 yo male former smoker, with COPD, hypertension, and CAD -> PCI of LPDA with TANISHA on 2/18/22 at [facility]. ECG today demonstrated sinus rhythm.  Patient is clinically stable from CAD standpoint. Normal stress myocardial perfusion per 11/2023 Lexiscan nuclear stress test. Will continue aspirin 81 mg po daily, clopidogrel 75 mg po daily, amlodipine, and rosuvastatin.  BP is adequately controlled. Will continue lisinopril 40 mg po daily and amlodipine.5 mg po daily.   per 1/2024 labs. Will continue rosuvastatin to 40 mg po daily.

## 2024-06-17 NOTE — ASSESSMENT
[FreeTextEntry1] : 61 yo male former smoker, with COPD, hypertension, and recently diagnosed CAD -> PCI of LPDA with TANISHA on 2/18/22 at [facility].\par ECG today demonstrated sinus rhythm.\par \par Patient is clinically stable from CAD standpoint.\par Will continue aspirin 81 mg po daily, clopidogrel 75 mg po daily, amlodipine, and rosuvastatin.\par Will check labs today (CBC, CMP, lipid panel).\par \par BP is adequately controlled.\par Will continue lisinopril 40 mg po daily and amlodipine.5 mg po daily.\par \par LDL 98 per 8/2022 labs.\par Will continue rosuvastatin to 40 mg po daily at this time pending lipid panel.\par

## 2024-06-17 NOTE — CARDIOLOGY SUMMARY
[de-identified] : 6/17/24 ECG: Sinus rhythm, rate 83 bpm 5/24/23 ECG: Sinus rhythm, rate 77 bpm 11/16/22 ECG: Sinus rhythm, rate 65 bpm [de-identified] : 11/6/23 Lexiscan Myoview (at [place]): Normal stress myocardial perfusion. Normal LV wall motion, LVEF 61%. 2/11/22 Lexiscan Myoview (at [place]): Unable to reach target HR with treadmill and converted to Lexiscan nuclear stress test. No CP but dyspnea. No ECG changes. Small area of mild to moderate ischemia in mid to basal inferior region. Small area of mild basal anterolateral ischemia. Normal LV size and wall motion, LVEF 71%. [de-identified] : \par  2/17/22 Echo: Normal LV size and systolic function, LVEF 61%. Upper normal LA volume index (34 ml/m2). Mild MV thickening. Trace MR. Trace TR. Normal PASP. Trace NM. Mildly dilated aortic root (3.8 cm).\par   [de-identified] : 2/18/22 Cardiac cath (at [facility]): 90% LPDA -> PCI with TANISHA (Skypoint stent). 60% distal RCA (co-dominant). LVEF 60%. LVEDP 14 mmHg.

## 2024-08-16 ENCOUNTER — RX RENEWAL (OUTPATIENT)
Age: 64
End: 2024-08-16

## 2024-09-24 ENCOUNTER — RX RENEWAL (OUTPATIENT)
Age: 64
End: 2024-09-24

## 2024-10-03 ENCOUNTER — APPOINTMENT (OUTPATIENT)
Dept: OPHTHALMOLOGY | Facility: CLINIC | Age: 64
End: 2024-10-03
Payer: COMMERCIAL

## 2024-10-03 ENCOUNTER — NON-APPOINTMENT (OUTPATIENT)
Age: 64
End: 2024-10-03

## 2024-10-03 PROCEDURE — 92002 INTRM OPH EXAM NEW PATIENT: CPT

## 2024-11-25 ENCOUNTER — RX RENEWAL (OUTPATIENT)
Age: 64
End: 2024-11-25

## 2025-01-13 ENCOUNTER — APPOINTMENT (OUTPATIENT)
Dept: CARDIOLOGY | Facility: CLINIC | Age: 65
End: 2025-01-13

## 2025-01-16 ENCOUNTER — NON-APPOINTMENT (OUTPATIENT)
Age: 65
End: 2025-01-16

## 2025-01-16 ENCOUNTER — APPOINTMENT (OUTPATIENT)
Dept: CARDIOLOGY | Facility: CLINIC | Age: 65
End: 2025-01-16
Payer: COMMERCIAL

## 2025-01-16 VITALS
SYSTOLIC BLOOD PRESSURE: 146 MMHG | HEART RATE: 67 BPM | OXYGEN SATURATION: 97 % | BODY MASS INDEX: 29.29 KG/M2 | DIASTOLIC BLOOD PRESSURE: 79 MMHG | RESPIRATION RATE: 14 BRPM | WEIGHT: 210 LBS

## 2025-01-16 DIAGNOSIS — I10 ESSENTIAL (PRIMARY) HYPERTENSION: ICD-10-CM

## 2025-01-16 DIAGNOSIS — E78.5 HYPERLIPIDEMIA, UNSPECIFIED: ICD-10-CM

## 2025-01-16 DIAGNOSIS — I25.10 ATHEROSCLEROTIC HEART DISEASE OF NATIVE CORONARY ARTERY W/OUT ANGINA PECTORIS: ICD-10-CM

## 2025-01-16 PROCEDURE — 93000 ELECTROCARDIOGRAM COMPLETE: CPT

## 2025-01-16 PROCEDURE — 99214 OFFICE O/P EST MOD 30 MIN: CPT

## 2025-01-22 ENCOUNTER — APPOINTMENT (OUTPATIENT)
Dept: PULMONOLOGY | Facility: CLINIC | Age: 65
End: 2025-01-22
Payer: COMMERCIAL

## 2025-01-22 VITALS
HEIGHT: 71 IN | HEART RATE: 79 BPM | DIASTOLIC BLOOD PRESSURE: 80 MMHG | BODY MASS INDEX: 28.98 KG/M2 | WEIGHT: 207 LBS | OXYGEN SATURATION: 98 % | SYSTOLIC BLOOD PRESSURE: 140 MMHG

## 2025-01-22 DIAGNOSIS — J44.9 CHRONIC OBSTRUCTIVE PULMONARY DISEASE, UNSPECIFIED: ICD-10-CM

## 2025-01-22 DIAGNOSIS — J20.9 ACUTE BRONCHITIS, UNSPECIFIED: ICD-10-CM

## 2025-01-22 PROCEDURE — 99213 OFFICE O/P EST LOW 20 MIN: CPT

## 2025-01-22 RX ORDER — PROMETHAZINE HYDROCHLORIDE AND CODEINE PHOSPHATE 6.25; 1 MG/5ML; MG/5ML
6.25-1 SOLUTION ORAL
Qty: 60 | Refills: 0 | Status: ACTIVE | COMMUNITY
Start: 2025-01-22 | End: 1900-01-01

## 2025-01-22 RX ORDER — HYDROCODONE BITARTRATE AND HOMATROPINE METHYLBROMIDE 1.5; 5 MG/5ML; MG/5ML
5-1.5 SOLUTION ORAL
Qty: 60 | Refills: 0 | Status: ACTIVE | COMMUNITY
Start: 2025-01-22 | End: 1900-01-01

## 2025-02-27 ENCOUNTER — APPOINTMENT (OUTPATIENT)
Dept: PODIATRY | Facility: CLINIC | Age: 65
End: 2025-02-27
Payer: COMMERCIAL

## 2025-02-27 DIAGNOSIS — M72.2 PLANTAR FASCIAL FIBROMATOSIS: ICD-10-CM

## 2025-02-27 PROCEDURE — L3000: CPT | Mod: RT

## 2025-02-27 PROCEDURE — 99214 OFFICE O/P EST MOD 30 MIN: CPT

## 2025-02-27 RX ORDER — DICLOFENAC SODIUM 75 MG/1
75 TABLET, DELAYED RELEASE ORAL
Qty: 30 | Refills: 1 | Status: ACTIVE | COMMUNITY
Start: 2025-02-27 | End: 1900-01-01

## 2025-03-18 ENCOUNTER — APPOINTMENT (OUTPATIENT)
Dept: PODIATRY | Facility: CLINIC | Age: 65
End: 2025-03-18
Payer: COMMERCIAL

## 2025-03-18 DIAGNOSIS — M72.2 PLANTAR FASCIAL FIBROMATOSIS: ICD-10-CM

## 2025-03-18 PROCEDURE — 97760 ORTHOTIC MGMT&TRAING 1ST ENC: CPT | Mod: NC

## 2025-03-31 ENCOUNTER — NON-APPOINTMENT (OUTPATIENT)
Age: 65
End: 2025-03-31

## 2025-03-31 ENCOUNTER — APPOINTMENT (OUTPATIENT)
Dept: PULMONOLOGY | Facility: CLINIC | Age: 65
End: 2025-03-31
Payer: COMMERCIAL

## 2025-03-31 VITALS
HEART RATE: 69 BPM | HEIGHT: 71 IN | OXYGEN SATURATION: 97 % | BODY MASS INDEX: 28.98 KG/M2 | WEIGHT: 207 LBS | SYSTOLIC BLOOD PRESSURE: 110 MMHG | DIASTOLIC BLOOD PRESSURE: 80 MMHG

## 2025-03-31 DIAGNOSIS — J44.9 CHRONIC OBSTRUCTIVE PULMONARY DISEASE, UNSPECIFIED: ICD-10-CM

## 2025-03-31 PROCEDURE — 94010 BREATHING CAPACITY TEST: CPT

## 2025-03-31 PROCEDURE — 99214 OFFICE O/P EST MOD 30 MIN: CPT | Mod: 25

## 2025-04-10 ENCOUNTER — APPOINTMENT (OUTPATIENT)
Dept: OPHTHALMOLOGY | Facility: CLINIC | Age: 65
End: 2025-04-10

## 2025-05-09 ENCOUNTER — APPOINTMENT (OUTPATIENT)
Dept: PULMONOLOGY | Facility: CLINIC | Age: 65
End: 2025-05-09

## 2025-05-16 RX ORDER — FLUTICASONE FUROATE, UMECLIDINIUM BROMIDE AND VILANTEROL TRIFENATATE 100; 62.5; 25 UG/1; UG/1; UG/1
100-62.5-25 POWDER RESPIRATORY (INHALATION)
Qty: 1 | Refills: 3 | Status: ACTIVE | COMMUNITY
Start: 2025-05-16 | End: 1900-01-01

## 2025-05-19 ENCOUNTER — APPOINTMENT (OUTPATIENT)
Dept: CARDIOLOGY | Facility: CLINIC | Age: 65
End: 2025-05-19
Payer: COMMERCIAL

## 2025-05-19 VITALS
SYSTOLIC BLOOD PRESSURE: 150 MMHG | DIASTOLIC BLOOD PRESSURE: 89 MMHG | HEART RATE: 83 BPM | RESPIRATION RATE: 14 BRPM | OXYGEN SATURATION: 96 % | BODY MASS INDEX: 29.4 KG/M2 | WEIGHT: 210 LBS | HEIGHT: 71 IN

## 2025-05-19 DIAGNOSIS — E78.5 HYPERLIPIDEMIA, UNSPECIFIED: ICD-10-CM

## 2025-05-19 DIAGNOSIS — I10 ESSENTIAL (PRIMARY) HYPERTENSION: ICD-10-CM

## 2025-05-19 DIAGNOSIS — I25.10 ATHEROSCLEROTIC HEART DISEASE OF NATIVE CORONARY ARTERY W/OUT ANGINA PECTORIS: ICD-10-CM

## 2025-05-19 DIAGNOSIS — M79.10 MYALGIA, UNSPECIFIED SITE: ICD-10-CM

## 2025-05-19 DIAGNOSIS — R05.3 CHRONIC COUGH: ICD-10-CM

## 2025-05-19 PROCEDURE — G2211 COMPLEX E/M VISIT ADD ON: CPT

## 2025-05-19 PROCEDURE — 99214 OFFICE O/P EST MOD 30 MIN: CPT

## 2025-05-20 PROBLEM — M79.10 MYALGIA: Status: ACTIVE | Noted: 2025-05-20

## 2025-05-20 PROBLEM — R05.3 COUGH, PERSISTENT: Status: ACTIVE | Noted: 2025-05-20

## 2025-05-22 ENCOUNTER — APPOINTMENT (OUTPATIENT)
Dept: PULMONOLOGY | Facility: CLINIC | Age: 65
End: 2025-05-22

## 2025-05-27 RX ORDER — LOSARTAN POTASSIUM 50 MG/1
50 TABLET, FILM COATED ORAL
Qty: 180 | Refills: 3 | Status: ACTIVE | COMMUNITY
Start: 2025-05-19 | End: 1900-01-01

## 2025-06-05 RX ORDER — HYDROCHLOROTHIAZIDE 12.5 MG/1
12.5 TABLET ORAL
Qty: 90 | Refills: 1 | Status: ACTIVE | COMMUNITY
Start: 2025-06-02

## 2025-06-05 RX ORDER — EZETIMIBE 10 MG/1
10 TABLET ORAL
Qty: 90 | Refills: 3 | Status: ACTIVE | COMMUNITY
Start: 2025-06-02

## 2025-07-03 NOTE — HISTORY OF PRESENT ILLNESS
[FreeTextEntry1] : 62 yo male former smoker, with COPD and hypertension, who presents today for cardiac evaluation of exertional chest and dyspnea. He reports that he developed exertional dyspnea on 1/28/22 (Friday), which persisted over the weekend. On Monday (1/31/22), he developed exertional dyspnea and chest pain while shoveling snow earlier that day. His symptoms persisted throughout the day and he went to [facility] for further evaluation. ECG and CXR were unremarkable. Labs were only remarkable for elevated WBC. Patient had recent lung cancer screening CT scan on 1/27/22 at [facility], which reported small bilateral nodules but also reported coronary artery calcifications. He denies any recurrence of chest pain since 1/31/22 but has also not been engaging in strenuous physical activity since that time. periumbilical

## 2025-07-16 ENCOUNTER — APPOINTMENT (OUTPATIENT)
Dept: CARDIOLOGY | Facility: CLINIC | Age: 65
End: 2025-07-16
Payer: COMMERCIAL

## 2025-07-16 VITALS
OXYGEN SATURATION: 95 % | WEIGHT: 203 LBS | HEART RATE: 81 BPM | DIASTOLIC BLOOD PRESSURE: 90 MMHG | SYSTOLIC BLOOD PRESSURE: 150 MMHG | BODY MASS INDEX: 28.31 KG/M2 | RESPIRATION RATE: 14 BRPM

## 2025-07-16 PROCEDURE — 93000 ELECTROCARDIOGRAM COMPLETE: CPT

## 2025-07-16 PROCEDURE — G2211 COMPLEX E/M VISIT ADD ON: CPT

## 2025-07-16 PROCEDURE — 36415 COLL VENOUS BLD VENIPUNCTURE: CPT

## 2025-07-16 PROCEDURE — 99214 OFFICE O/P EST MOD 30 MIN: CPT

## 2025-07-17 PROBLEM — Z87.39 HISTORY OF MUSCLE PAIN: Status: RESOLVED | Noted: 2025-05-20 | Resolved: 2025-07-17

## 2025-07-17 LAB
ALBUMIN SERPL ELPH-MCNC: 4.4 G/DL
ALP BLD-CCNC: 74 U/L
ALT SERPL-CCNC: 19 U/L
ANION GAP SERPL CALC-SCNC: 17 MMOL/L
AST SERPL-CCNC: 18 U/L
BILIRUB SERPL-MCNC: 0.8 MG/DL
BUN SERPL-MCNC: 15 MG/DL
CALCIUM SERPL-MCNC: 9.2 MG/DL
CHLORIDE SERPL-SCNC: 102 MMOL/L
CHOLEST SERPL-MCNC: 223 MG/DL
CO2 SERPL-SCNC: 20 MMOL/L
CREAT SERPL-MCNC: 0.82 MG/DL
EGFRCR SERPLBLD CKD-EPI 2021: 97 ML/MIN/1.73M2
GLUCOSE SERPL-MCNC: 108 MG/DL
HDLC SERPL-MCNC: 44 MG/DL
LDLC SERPL-MCNC: 123 MG/DL
NONHDLC SERPL-MCNC: 180 MG/DL
POTASSIUM SERPL-SCNC: 4 MMOL/L
PROT SERPL-MCNC: 7.4 G/DL
SODIUM SERPL-SCNC: 139 MMOL/L
TRIGL SERPL-MCNC: 322 MG/DL

## 2025-07-17 RX ORDER — PRAVASTATIN SODIUM 10 MG/1
10 TABLET ORAL DAILY
Qty: 90 | Refills: 3 | Status: ACTIVE | COMMUNITY
Start: 2025-07-17 | End: 1900-01-01

## 2025-09-10 ENCOUNTER — APPOINTMENT (OUTPATIENT)
Dept: CARDIOLOGY | Facility: CLINIC | Age: 65
End: 2025-09-10
Payer: COMMERCIAL

## 2025-09-10 DIAGNOSIS — I71.21 ANEURYSM OF THE ASCENDING AORTA, WITHOUT RUPTURE: ICD-10-CM

## 2025-09-10 PROCEDURE — 93306 TTE W/DOPPLER COMPLETE: CPT

## 2025-09-11 ENCOUNTER — NON-APPOINTMENT (OUTPATIENT)
Age: 65
End: 2025-09-11